# Patient Record
Sex: FEMALE | HISPANIC OR LATINO | Employment: FULL TIME | URBAN - METROPOLITAN AREA
[De-identification: names, ages, dates, MRNs, and addresses within clinical notes are randomized per-mention and may not be internally consistent; named-entity substitution may affect disease eponyms.]

---

## 2017-04-25 ENCOUNTER — OFFICE VISIT (OUTPATIENT)
Dept: MEDICAL GROUP | Facility: CLINIC | Age: 47
End: 2017-04-25
Payer: COMMERCIAL

## 2017-04-25 VITALS
WEIGHT: 145 LBS | BODY MASS INDEX: 24.75 KG/M2 | HEIGHT: 64 IN | SYSTOLIC BLOOD PRESSURE: 112 MMHG | HEART RATE: 60 BPM | OXYGEN SATURATION: 99 % | TEMPERATURE: 98.1 F | DIASTOLIC BLOOD PRESSURE: 70 MMHG | RESPIRATION RATE: 14 BRPM

## 2017-04-25 DIAGNOSIS — R68.89 LIP SYMPTOM: ICD-10-CM

## 2017-04-25 DIAGNOSIS — J30.2 OTHER SEASONAL ALLERGIC RHINITIS: ICD-10-CM

## 2017-04-25 DIAGNOSIS — J34.9 NOSE SYMPTOM: ICD-10-CM

## 2017-04-25 PROCEDURE — 99214 OFFICE O/P EST MOD 30 MIN: CPT | Performed by: NURSE PRACTITIONER

## 2017-04-25 RX ORDER — LORATADINE 10 MG/1
CAPSULE, LIQUID FILLED ORAL
COMMUNITY
End: 2018-11-12

## 2017-04-25 RX ORDER — FLUTICASONE PROPIONATE 50 MCG
1 SPRAY, SUSPENSION (ML) NASAL DAILY
Qty: 16 G | Refills: 11 | Status: SHIPPED | OUTPATIENT
Start: 2017-04-25 | End: 2018-04-24 | Stop reason: SDUPTHER

## 2017-04-25 NOTE — Clinical Note
April 25, 2017         Patient: Jamilah Chadwick   YOB: 1970   Date of Visit: 4/25/2017           To Whom it May Concern:    Jamilah Chadwick was seen in my clinic on 4/25/2017.    If you have any questions or concerns, please don't hesitate to call.        Sincerely,           DAYANA Leach  Electronically Signed

## 2017-04-25 NOTE — MR AVS SNAPSHOT
"        Jamilah Chadwick   2017 4:00 PM   Office Visit   MRN: [id number]    Department:  [facility]   Dept Phone:  [phone]    Description:  Female : 1970   Provider:  DAYANA Leach           Reason for Visit     Follow-Up Sore bottom lip x 2 months; also seasonal allergies       Allergies as of 2017     No Known Allergies      You were diagnosed with     Other seasonal allergic rhinitis   [9590361]       Nose symptom   [872139]         Vital Signs     Blood Pressure Pulse Temperature Respirations Height Weight    112/70 mmHg 60 36.7 °C (98.1 °F) 14 1.626 m (5' 4.02\") 65.772 kg (145 lb)    Body Mass Index Oxygen Saturation Last Menstrual Period Breastfeeding? Smoking Status       24.88 kg/m2 99% 2014 No Never Smoker        Basic Information     Date Of Birth Sex Race Ethnicity Preferred Language    1970 Female  or   Origin (Italian,Serbian,Luxembourger,Anuj, etc) English      Problem List              ICD-10-CM Priority Class Noted - Resolved    Carpal tunnel syndrome G56.00   Unknown - Present    Pure hypercholesterolemia E78.00   2016 - Present    Allergic rhinitis J30.9   2016 - Present      Health Maintenance        Date Due Completion Dates    MAMMOGRAM 2016 (Done), 2014 (Done), 10/6/2005    Override on 2015: Done (NL)    Override on 2014: Done    IMM DTaP/Tdap/Td Vaccine (2 - Td) 2025            Current Immunizations     Tdap Vaccine 2015      Below and/or attached are the medications your provider expects you to take. Review all of your home medications and newly ordered medications with your provider and/or pharmacist. Follow medication instructions as directed by your provider and/or pharmacist. Please keep your medication list with you and share with your provider. Update the information when medications are discontinued, doses are changed, or new medications (including " over-the-counter products) are added; and carry medication information at all times in the event of emergency situations     Allergies:  No Known Allergies          Medications  Valid as of: April 25, 2017 -  4:20 PM    Generic Name Brand Name Tablet Size Instructions for use    Estradiol (Cream) ESTRACE VAGINAL 0.1 MG/GM         Fluticasone Propionate (Suspension) FLONASE 50 MCG/ACT Spray 1 Spray in nose every day.        Loratadine (Cap) Loratadine 10 MG Take  by mouth.        Mupirocin (Ointment) BACTROBAN 2 % Apply 1 Application to affected area(s) every day.        .                 Medicines prescribed today were sent to:     [id number] - [place], [place] - [address]    [address] [place] [address]    Phone: [phone] Fax: [phone]    Open 24 Hours?: No      Medication refill instructions:       If your prescription bottle indicates you have medication refills left, it is not necessary to call your provider’s office. Please contact your pharmacy and they will refill your medication.    If your prescription bottle indicates you do not have any refills left, you may request refills at any time through one of the following ways: The online [employer] system (except Urgent Care), by calling your provider’s office, or by asking your pharmacy to contact your provider’s office with a refill request. Medication refills are processed only during regular business hours and may not be available until the next business day. Your provider may request additional information or to have a follow-up visit with you prior to refilling your medication.   *Please Note: Medication refills are assigned a new Rx number when refilled electronically. Your pharmacy may indicate that no refills were authorized even though a new prescription for the same medication is available at the pharmacy. Please request the medicine by name with the pharmacy before contacting your provider for a refill.           [employer] Access Code:  Activation code not generated  Current MyChart Status: Active

## 2017-04-25 NOTE — PROGRESS NOTES
"CC: Follow-Up        HPI:     Jamilah presents today for the followin. Other seasonal allergic rhinitis  Cc been using Claritin however due to insurance change she needs a new prescription for her Flonase. This lasted a better control for seasonal allergies. She's had nasal congestion postnasal drip and drainage. She states when she wakes up after being in a supine position this can lead to some \"chest congestion \" and she felt well this makes it more difficult for her to breathe      2. Nose symptom  Patient states over the last couple months she's been having a sore on the inside of her right nostril towards the end. She states sometimes it makes her nose appears swollen. Painful to the touch.    3. Lip symptom  Complains of 2 months with a burning sensation just under her bottom lip. States it's most every day but not all data be intermittent. No association of injury or trauma. No associated rash or changes in skin. She states it feels better she applies pressure when the symptom is present.    Current Outpatient Prescriptions   Medication Sig Dispense Refill   • Loratadine (CLARITIN) 10 MG Cap Take  by mouth.     • fluticasone (FLONASE) 50 MCG/ACT nasal spray Spray 1 Spray in nose every day. 16 g 11   • mupirocin (BACTROBAN) 2 % Ointment Apply 1 Application to affected area(s) every day. 1 Tube 0   • ESTRACE VAGINAL 0.1 MG/GM vaginal cream        No current facility-administered medications for this visit.     Social History   Substance Use Topics   • Smoking status: Never Smoker    • Smokeless tobacco: Never Used   • Alcohol Use: No     I reviewed patients allergies, problem list and medications today in [facility].    ROS: Any/all pertinent positives listed in the HPI, otherwise all others reviewed are negative today.      /70 mmHg  Pulse 60  Temp(Src) 36.7 °C (98.1 °F)  Resp 14  Ht 1.626 m (5' 4.02\")  Wt 65.772 kg (145 lb)  BMI 24.88 kg/m2  SpO2 99%  LMP 2014  Breastfeeding? No    Exam:  "   Gen: Alert and oriented, No apparent distress. WDWN  Psych: A+Ox3, normal affect and mood  Skin: Warm, dry and intact. Good turgor   No rashes in visible areas.  Eye: Conjunctiva clear, lids normal  ENMT: Lips without lesions-no swelling, rash or skin disruption noted internally or externally, good dentition  Oropharynx clear, mild erythema bilaterally disturbance. Right near at the most proximal portion shows very mild irritation in her crease. There is no abscess palpable pustule vesicle noted. This is in the area that she points out where there is irritation and sometimes swelling  Neck: No Lymphadenopathy, Thyromegaly, Bruits.   Trachea midline, no masses  Lungs: Clear to auscultation bilaterally, no rales or rhonchi   Unlabored respiratory effort.   CV: Regular rate and rhythm, S1, S2. No murmurs.   No Edema      Assessment and Plan.   47 y.o. female with the following issues.    1. Other seasonal allergic rhinitis  Stable.  Continue claritin.  Restart flonase.  Discussed nettipot  - fluticasone (FLONASE) 50 MCG/ACT nasal spray; Spray 1 Spray in nose every day.  Dispense: 16 g; Refill: 11    2. Nose symptom  Stable. Reassurance bactroban to nose.  - mupirocin (BACTROBAN) 2 % Ointment; Apply 1 Application to affected area(s) every day.  Dispense: 1 Tube; Refill: 0    3. Lip symptom  Unknown etiology. Normal exam. Discussed not manipulate the tissue in the way that she was showing in the office.

## 2017-04-26 ENCOUNTER — TELEPHONE (OUTPATIENT)
Dept: MEDICAL GROUP | Facility: CLINIC | Age: 47
End: 2017-04-26

## 2017-04-26 DIAGNOSIS — E78.00 PURE HYPERCHOLESTEROLEMIA: ICD-10-CM

## 2017-04-26 DIAGNOSIS — Z13.29 SCREENING FOR THYROID DISORDER: ICD-10-CM

## 2017-04-26 DIAGNOSIS — Z13.21 ENCOUNTER FOR VITAMIN DEFICIENCY SCREENING: ICD-10-CM

## 2017-04-26 DIAGNOSIS — J30.2 SEASONAL ALLERGIC RHINITIS, UNSPECIFIED ALLERGIC RHINITIS TRIGGER: ICD-10-CM

## 2017-04-26 NOTE — TELEPHONE ENCOUNTER
1. Caller Name: Brandon                                         Call Back Number: [phone] (home)         Patient approves a detailed voicemail message: yes    2. SPECIFIC Action To Be Taken: routine blood orders needed (CBC, CMP, TSH, LIPIL, A1C, VITAMIN D) anything else she needs    3. Diagnosis/Clinical Reason for Request: routine     4. Specialty & Provider Name/Lab/Imaging Location: lab    5. Is appointment scheduled for requested order/referral: no    Patient informed they will receive a return phone call from the office ONLY if there are any questions before processing their request. Advised to call back if they haven't received a call from the referral department in 5 days.

## 2017-04-26 NOTE — TELEPHONE ENCOUNTER
She isnt due for routine labs until 9/2017.  Labs were done last 9/2016-normal.    I can pre-order and she can complete in sept if she wishes.  Sugar has been normal-no reason to order A1c.  Insurance will not cover it.    They are ordered.  Will need to send to patient as she uses [facility]

## 2017-10-03 ENCOUNTER — OFFICE VISIT (OUTPATIENT)
Dept: MEDICAL GROUP | Facility: CLINIC | Age: 47
End: 2017-10-03
Payer: COMMERCIAL

## 2017-10-03 VITALS
OXYGEN SATURATION: 99 % | SYSTOLIC BLOOD PRESSURE: 112 MMHG | BODY MASS INDEX: 24.59 KG/M2 | HEIGHT: 64 IN | DIASTOLIC BLOOD PRESSURE: 70 MMHG | RESPIRATION RATE: 14 BRPM | TEMPERATURE: 97.9 F | WEIGHT: 144 LBS | HEART RATE: 62 BPM

## 2017-10-03 DIAGNOSIS — Z28.21 INFLUENZA VACCINATION DECLINED: ICD-10-CM

## 2017-10-03 DIAGNOSIS — M79.672 LEFT FOOT PAIN: ICD-10-CM

## 2017-10-03 PROCEDURE — 99214 OFFICE O/P EST MOD 30 MIN: CPT | Performed by: NURSE PRACTITIONER

## 2017-10-03 RX ORDER — GABAPENTIN 100 MG/1
100 CAPSULE ORAL 3 TIMES DAILY
Qty: 90 CAP | Refills: 5 | Status: SHIPPED | OUTPATIENT
Start: 2017-10-03 | End: 2018-11-12

## 2017-10-03 RX ORDER — METHYLPREDNISOLONE 4 MG/1
TABLET ORAL
Qty: 21 TAB | Refills: 0 | Status: SHIPPED | OUTPATIENT
Start: 2017-10-03 | End: 2018-04-24

## 2017-10-03 ASSESSMENT — PATIENT HEALTH QUESTIONNAIRE - PHQ9: CLINICAL INTERPRETATION OF PHQ2 SCORE: 0

## 2017-10-03 NOTE — LETTER
[employer] [facility]  AGUSTÍN Leach.  [address]  [place] [address]  Fax: [phone]   Authorization for Release/Disclosure of   Protected Health Information   Name: JAMILAH DELGADO : 1970 SSN: [id number]   Address: [address] [place], [place] [address] [place] [address] Phone:    [phone] (home)    I authorize the entity listed below to release/disclose the PHI below to:   [facility]/DAYANA Leach and DAYANA Leach   Provider or Entity Name:  Dr Jimenez   [facility], [facility]  [address], [address]  [place] [address]  Phone:  [phone]    Fax:  [phone]   Reason for request: continuity of care   Information to be released:    [  ] LAST COLONOSCOPY,  including any PATH REPORT and follow-up  [  ] LAST FIT/COLOGUARD RESULT [  ] LAST DEXA  [  ] LAST MAMMOGRAM  [  ] LAST PAP  [  ] LAST LABS [  ] RETINA EXAM REPORT  [  ] IMMUNIZATION RECORDS  [X] Release all info      [  ] Check here and initial the line next to each item to release ALL health information INCLUDING  _____ Care and treatment for drug and / or alcohol abuse  _____ HIV testing, infection status, or AIDS  _____ Genetic Testing    DATES OF SERVICE OR TIME PERIOD TO BE DISCLOSED: _____________  I understand and acknowledge that:  * This Authorization may be revoked at any time by you in writing, except if your health information has already been used or disclosed.  * Your health information that will be used or disclosed as a result of you signing this authorization could be re-disclosed by the recipient. If this occurs, your re-disclosed health information may no longer be protected by State or Federal laws.  * You may refuse to sign this Authorization. Your refusal will not affect your ability to obtain treatment.  * This Authorization becomes effective upon signing and will  on (date) __________.      If no date is indicated, this Authorization will  one (1) year from the signature date.    Name: Jamilah  Netta    Signature:   Date:     10/3/2017       PLEASE FAX REQUESTED RECORDS BACK TO: [phone]

## 2017-10-03 NOTE — LETTER
[facility]  AGUSTÍN Leach.  [address]  [place] [address]  Fax: [phone]   Authorization for Release/Disclosure of   Protected Health Information   Name: JAMILAH DELGADO : 1970 SSN: [id number]   Address: [address] [place] [address]  [place] [address] Phone:    [phone] (home)    I authorize the entity listed below to release/disclose the PHI below to:   [facility]/DAYANA Leach and DAYANA Leach   Provider or Entity Name:  [facility]   Address   City, [facility]  [place] Phone:      Fax:  [phone]   Reason for request: continuity of care   Information to be released:    [  ] LAST COLONOSCOPY,  including any PATH REPORT and follow-up  [  ] LAST FIT/COLOGUARD RESULT [  ] LAST DEXA  [X] LAST MAMMOGRAM  [  ] LAST PAP  [  ] LAST LABS [  ] RETINA EXAM REPORT  [  ] IMMUNIZATION RECORDS  [  ] Release all info      [  ] Check here and initial the line next to each item to release ALL health information INCLUDING  _____ Care and treatment for drug and / or alcohol abuse  _____ HIV testing, infection status, or AIDS  _____ Genetic Testing    DATES OF SERVICE OR TIME PERIOD TO BE DISCLOSED: LAST MAMMOGRAM APPROX  understand and acknowledge that:  * This Authorization may be revoked at any time by you in writing, except if your health information has already been used or disclosed.  * Your health information that will be used or disclosed as a result of you signing this authorization could be re-disclosed by the recipient. If this occurs, your re-disclosed health information may no longer be protected by State or Federal laws.  * You may refuse to sign this Authorization. Your refusal will not affect your ability to obtain treatment.  * This Authorization becomes effective upon signing and will  on (date) __________.      If no date is indicated, this Authorization will  one (1) year from the signature date.    Name: Jamilah  Netta    Signature:   Date:     10/3/2017       PLEASE FAX REQUESTED RECORDS BACK TO: [phone]

## 2017-10-03 NOTE — PROGRESS NOTES
"CC: Knee Pain (Left knee pain that spreads down shin and has a firm, small bump x 6 months)        HPI:     Jamilah presents today for the followin. Left foot pain  Here today with a family member stating she did see the podiatrist regarding the pain was on the ball of her left foot. She did get an injection and had an x-ray there that was normal. The injection helped somewhat however she feels the pain now is radiating up the lateral side of her left leg to the knee. No new injury or trauma. The pain is worse after a days work. She currently is not in pain today however she does feel most days.  She is still small bump on her shin on the left side and she was unsure if this is related    Overall pain is not present for 6 months. They did talk about orthotics at the podiatrist but they weren't sure if her insurance cover it so she has not pursued this at this time    Not better or worse with any specific shoe    Current Outpatient Prescriptions   Medication Sig Dispense Refill   • MethylPREDNISolone (MEDROL DOSEPAK) 4 MG Tablet Therapy Pack As directed on the packaging label. 21 Tab 0   • gabapentin (NEURONTIN) 100 MG Cap Take 1 Cap by mouth 3 times a day. 90 Cap 5   • Loratadine (CLARITIN) 10 MG Cap Take  by mouth.     • fluticasone (FLONASE) 50 MCG/ACT nasal spray Spray 1 Spray in nose every day. 16 g 11   • mupirocin (BACTROBAN) 2 % Ointment Apply 1 Application to affected area(s) every day. 1 Tube 0   • ESTRACE VAGINAL 0.1 MG/GM vaginal cream        No current facility-administered medications for this visit.      Social History   Substance Use Topics   • Smoking status: Never Smoker   • Smokeless tobacco: Never Used   • Alcohol use No     I reviewed patients allergies, problem list and medications today in [facility].    ROS: Any/all pertinent positives listed in the HPI, otherwise all others reviewed are negative today.      /70   Pulse 62   Temp 36.6 °C (97.9 °F)   Resp 14   Ht 1.626 m (5' 4\")   Wt " 65.3 kg (144 lb)   SpO2 99%   Breastfeeding? No   BMI 24.72 kg/m²     Exam:    Gen: Alert and oriented, No apparent distress. WDWN  Psych: A+Ox3, normal affect and mood  Skin: Warm, dry and intact. Good turgor   No rashes in visible areas.  Eye: Conjunctiva clear, lids normal  ENMT: Lips without lesions, good dentition  Lungs:Unlabored respiratory effort.   Ext: No clubbing, cyanosis, edema.   No gross deformity or edema edema or appreciable warmth noted on the left foot ankle or leg. I am unable to reproduce any pain or discomfort on exam especially with the foot today. There is a 1-2 mm firm mobile subcutaneous likely nodule over the left shin. Believe this to be noncontributory    Assessment and Plan.   47 y.o. female with the following issues.    1. Left foot pain  Stable. Normal exam. We will request the records from her podiatrist have a better idea. Will try Medrol Dosepak to see if this fixes any of her pain and inflammation. If not I'll have her start gabapentin once at night for the first week, 2 times a day secondly, increase to 3 times a day and third week continue at that dose.  Reassurance given regarding the tiny palpable bump over her left shin  - MethylPREDNISolone (MEDROL DOSEPAK) 4 MG Tablet Therapy Pack; As directed on the packaging label.  Dispense: 21 Tab; Refill: 0  - gabapentin (NEURONTIN) 100 MG Cap; Take 1 Cap by mouth 3 times a day.  Dispense: 90 Cap; Refill: 5    2. Influenza vaccination declined

## 2017-11-21 ENCOUNTER — TELEPHONE (OUTPATIENT)
Dept: MEDICAL GROUP | Facility: CLINIC | Age: 47
End: 2017-11-21

## 2017-11-21 NOTE — TELEPHONE ENCOUNTER
Please call Anne. She is a Beninese speaker. Let her know her blood sugar, liver, kidneys are normal. Cholesterol is borderline and I recommend she watch her carbohydrate, sugar and fat intake. Regular routine exercise will help this as well. Thyroid is normal. Vitamin D slightly low and recommende she buy an over-the-counter vitamin D3 supplement of 5000 units to take daily

## 2018-04-24 ENCOUNTER — OFFICE VISIT (OUTPATIENT)
Dept: MEDICAL GROUP | Facility: CLINIC | Age: 48
End: 2018-04-24
Payer: COMMERCIAL

## 2018-04-24 VITALS
HEIGHT: 64 IN | DIASTOLIC BLOOD PRESSURE: 70 MMHG | BODY MASS INDEX: 24.24 KG/M2 | OXYGEN SATURATION: 99 % | TEMPERATURE: 98.2 F | WEIGHT: 142 LBS | HEART RATE: 65 BPM | RESPIRATION RATE: 14 BRPM | SYSTOLIC BLOOD PRESSURE: 110 MMHG

## 2018-04-24 DIAGNOSIS — G56.01 CARPAL TUNNEL SYNDROME OF RIGHT WRIST: ICD-10-CM

## 2018-04-24 DIAGNOSIS — R20.0 NUMBNESS OF FINGERS: ICD-10-CM

## 2018-04-24 DIAGNOSIS — H92.03 EARACHE SYMPTOMS IN BOTH EARS: ICD-10-CM

## 2018-04-24 DIAGNOSIS — J30.2 OTHER SEASONAL ALLERGIC RHINITIS: ICD-10-CM

## 2018-04-24 PROCEDURE — 99214 OFFICE O/P EST MOD 30 MIN: CPT | Performed by: NURSE PRACTITIONER

## 2018-04-24 RX ORDER — FLUTICASONE PROPIONATE 50 MCG
2 SPRAY, SUSPENSION (ML) NASAL DAILY
Qty: 16 G | Refills: 11 | Status: SHIPPED | OUTPATIENT
Start: 2018-04-24 | End: 2018-11-12

## 2018-04-24 RX ORDER — NAPROXEN 500 MG/1
500 TABLET ORAL 2 TIMES DAILY WITH MEALS
Qty: 14 TAB | Refills: 0 | Status: SHIPPED | OUTPATIENT
Start: 2018-04-24 | End: 2019-02-14 | Stop reason: SDUPTHER

## 2018-04-24 NOTE — PROGRESS NOTES
CC: Otalgia (that spreads to head and around the ear x 1 week;) and Numbness (in left hand x 2-3 weeks)        HPI:     Jamilah presents today for the followin. Numbness of fingers/Carpal tunnel syndrome of right wrist  Today complaining of no pain however little bit of a tingling sensation on the distal ends of her second third and fourth digits on the left hand for 2-3 weeks. Historically about 2 or 3 years ago she had carpal tunnel release done by Dr. Holley. She states since then she's been using her support rest every night. This symptom tends to be more present if she is doing repetitive motions at work or if she is driving. Does not wake her up at night    3. Other seasonal allergic rhiniti/Earache symptoms in both ears  States for the last week she's been having an itching sensation in her left inner ear and a pressure and sometimes painful sensation on the right inner ear. She states she is traveling to [place] soon and would like to make sure she doesn't have an ear infection as this can bother her on the flight    Current Outpatient Prescriptions   Medication Sig Dispense Refill   • neomycin sulf/polymyx B sulf/HC soln (CORTISPORIN HC SOL) 3.5-48704-6 Solution Place 3 Drops in ear 4 times a day. Administer drops to both ears. 1 Bottle 0   • fluticasone (FLONASE) 50 MCG/ACT nasal spray Spray 2 Sprays in nose every day. 16 g 11   • naproxen (NAPROSYN) 500 MG Tab Take 1 Tab by mouth 2 times a day, with meals for 7 days. 14 Tab 0   • gabapentin (NEURONTIN) 100 MG Cap Take 1 Cap by mouth 3 times a day. 90 Cap 5   • Loratadine (CLARITIN) 10 MG Cap Take  by mouth.     • ESTRACE VAGINAL 0.1 MG/GM vaginal cream        No current facility-administered medications for this visit.      Social History   Substance Use Topics   • Smoking status: Never Smoker   • Smokeless tobacco: Never Used   • Alcohol use No     I reviewed patients allergies, problem list and medications today in [facility].    ROS: Any/all  "pertinent positives listed in the HPI, otherwise all others reviewed are negative today.      /70   Pulse 65   Temp 36.8 °C (98.2 °F)   Resp 14   Ht 1.626 m (5' 4\")   Wt 64.4 kg (142 lb)   SpO2 99%   Breastfeeding? No   BMI 24.37 kg/m²     Exam:   Gen: Alert and oriented, No apparent distress. WDWN  Psych: A+Ox3, normal affect and mood  Skin: Warm, dry and intact. Good turgor   No rashes in visible areas.  Eye: Conjunctiva clear, lids normal  ENMT: Lips without lesions, good dentition   Oropharynx clear.  TM fairly unremarkable with some mild erythema over normal-looking TM on the left ear.  Neck: No Lymphadenopathy, Thyromegaly, Bruits.   Trachea midline, no masses  Lungs: Clear to auscultation bilaterally, no rales or rhonchi   Unlabored respiratory effort.   CV: Regular rate and rhythm, S1, S2. No murmurs.   No Edema  Positive Phalen's      Assessment and Plan.   48 y.o. female with the following issues.    1. Numbness of fingers/Carpal tunnel syndrome of right wrist  Suspect carpal tunnel. We'll have her follow-up could be reevaluated with orthopedics. For a week will do a trial of naproxen which she tolerated well the past. Shetake it with food. Continue her wrist splint  - REFERRAL TO ORTHOPEDICS  - naproxen (NAPROSYN) 500 MG Tab; Take 1 Tab by mouth 2 times a day, with meals for 7 days.  Dispense: 14 Tab; Refill: 0    3. Other seasonal allergic rhinitis /. Earache symptoms in both ears  Stable. Suspect some of this is allergy related. We'll have her do the Cortisporin drops bilaterally as discussed the office and then restart her Flonase  - neomycin sulf/polymyx B sulf/HC soln (CORTISPORIN HC SOL) 3.5-04965-1 Solution; Place 3 Drops in ear 4 times a day. Administer drops to both ears.  Dispense: 1 Bottle; Refill: 0  - fluticasone (FLONASE) 50 MCG/ACT nasal spray; Spray 2 Sprays in nose every day.  Dispense: 16 g; Refill: 11        "

## 2018-05-01 ENCOUNTER — TELEPHONE (OUTPATIENT)
Dept: MEDICAL GROUP | Facility: CLINIC | Age: 48
End: 2018-05-01

## 2018-05-01 NOTE — TELEPHONE ENCOUNTER
1. Caller Name: Brandon                                         Call Back Number: [phone] (home)         Patient approves a detailed voicemail message: yes    Pt's son, Brandon, called to ask that dx numbness of both feet be added to ref for ZINA so that all of her issues can be addressed.    Angeles, if you're ok with me adding the dx to ref, I will let ref dept know to update it. I just wanted to double check before adding...

## 2018-11-12 ENCOUNTER — APPOINTMENT (OUTPATIENT)
Dept: RADIOLOGY | Facility: MEDICAL CENTER | Age: 48
End: 2018-11-12
Attending: EMERGENCY MEDICINE
Payer: COMMERCIAL

## 2018-11-12 ENCOUNTER — HOSPITAL ENCOUNTER (EMERGENCY)
Facility: MEDICAL CENTER | Age: 48
End: 2018-11-12
Attending: EMERGENCY MEDICINE
Payer: COMMERCIAL

## 2018-11-12 ENCOUNTER — TELEPHONE (OUTPATIENT)
Dept: HEALTH INFORMATION MANAGEMENT | Facility: OTHER | Age: 48
End: 2018-11-12

## 2018-11-12 ENCOUNTER — TELEPHONE (OUTPATIENT)
Dept: SCHEDULING | Facility: IMAGING CENTER | Age: 48
End: 2018-11-12

## 2018-11-12 VITALS
DIASTOLIC BLOOD PRESSURE: 65 MMHG | BODY MASS INDEX: 24.98 KG/M2 | HEART RATE: 71 BPM | HEIGHT: 65 IN | TEMPERATURE: 97.8 F | OXYGEN SATURATION: 98 % | WEIGHT: 149.91 LBS | SYSTOLIC BLOOD PRESSURE: 114 MMHG | RESPIRATION RATE: 16 BRPM

## 2018-11-12 DIAGNOSIS — R07.89 ATYPICAL CHEST PAIN: ICD-10-CM

## 2018-11-12 LAB
ALBUMIN SERPL BCP-MCNC: 4.2 G/DL (ref 3.2–4.9)
ALBUMIN/GLOB SERPL: 1.8 G/DL
ALP SERPL-CCNC: 51 U/L (ref 30–99)
ALT SERPL-CCNC: 27 U/L (ref 2–50)
ANION GAP SERPL CALC-SCNC: 7 MMOL/L (ref 0–11.9)
AST SERPL-CCNC: 21 U/L (ref 12–45)
BASOPHILS # BLD AUTO: 0.9 % (ref 0–1.8)
BASOPHILS # BLD: 0.04 K/UL (ref 0–0.12)
BILIRUB SERPL-MCNC: 0.5 MG/DL (ref 0.1–1.5)
BNP SERPL-MCNC: 12 PG/ML (ref 0–100)
BUN SERPL-MCNC: 15 MG/DL (ref 8–22)
CALCIUM SERPL-MCNC: 9 MG/DL (ref 8.4–10.2)
CHLORIDE SERPL-SCNC: 102 MMOL/L (ref 96–112)
CO2 SERPL-SCNC: 24 MMOL/L (ref 20–33)
CREAT SERPL-MCNC: 0.65 MG/DL (ref 0.5–1.4)
EKG IMPRESSION: NORMAL
EOSINOPHIL # BLD AUTO: 0.1 K/UL (ref 0–0.51)
EOSINOPHIL NFR BLD: 2.2 % (ref 0–6.9)
ERYTHROCYTE [DISTWIDTH] IN BLOOD BY AUTOMATED COUNT: 38.4 FL (ref 35.9–50)
GLOBULIN SER CALC-MCNC: 2.3 G/DL (ref 1.9–3.5)
GLUCOSE SERPL-MCNC: 92 MG/DL (ref 65–99)
HCT VFR BLD AUTO: 39.1 % (ref 37–47)
HGB BLD-MCNC: 13.3 G/DL (ref 12–16)
IMM GRANULOCYTES # BLD AUTO: 0.02 K/UL (ref 0–0.11)
IMM GRANULOCYTES NFR BLD AUTO: 0.4 % (ref 0–0.9)
LYMPHOCYTES # BLD AUTO: 1.72 K/UL (ref 1–4.8)
LYMPHOCYTES NFR BLD: 37.2 % (ref 22–41)
MCH RBC QN AUTO: 29.4 PG (ref 27–33)
MCHC RBC AUTO-ENTMCNC: 34 G/DL (ref 33.6–35)
MCV RBC AUTO: 86.5 FL (ref 81.4–97.8)
MONOCYTES # BLD AUTO: 0.28 K/UL (ref 0–0.85)
MONOCYTES NFR BLD AUTO: 6.1 % (ref 0–13.4)
NEUTROPHILS # BLD AUTO: 2.46 K/UL (ref 2–7.15)
NEUTROPHILS NFR BLD: 53.2 % (ref 44–72)
NRBC # BLD AUTO: 0 K/UL
NRBC BLD-RTO: 0 /100 WBC
PLATELET # BLD AUTO: 195 K/UL (ref 164–446)
PMV BLD AUTO: 10 FL (ref 9–12.9)
POTASSIUM SERPL-SCNC: 3.8 MMOL/L (ref 3.6–5.5)
PROT SERPL-MCNC: 6.5 G/DL (ref 6–8.2)
RBC # BLD AUTO: 4.52 M/UL (ref 4.2–5.4)
SODIUM SERPL-SCNC: 133 MMOL/L (ref 135–145)
TROPONIN I SERPL-MCNC: <0.02 NG/ML (ref 0–0.04)
WBC # BLD AUTO: 4.6 K/UL (ref 4.8–10.8)

## 2018-11-12 PROCEDURE — 83880 ASSAY OF NATRIURETIC PEPTIDE: CPT

## 2018-11-12 PROCEDURE — 80053 COMPREHEN METABOLIC PANEL: CPT

## 2018-11-12 PROCEDURE — 36415 COLL VENOUS BLD VENIPUNCTURE: CPT

## 2018-11-12 PROCEDURE — A9270 NON-COVERED ITEM OR SERVICE: HCPCS | Performed by: EMERGENCY MEDICINE

## 2018-11-12 PROCEDURE — 99284 EMERGENCY DEPT VISIT MOD MDM: CPT

## 2018-11-12 PROCEDURE — 700102 HCHG RX REV CODE 250 W/ 637 OVERRIDE(OP): Performed by: EMERGENCY MEDICINE

## 2018-11-12 PROCEDURE — 84484 ASSAY OF TROPONIN QUANT: CPT

## 2018-11-12 PROCEDURE — 93005 ELECTROCARDIOGRAM TRACING: CPT | Performed by: EMERGENCY MEDICINE

## 2018-11-12 PROCEDURE — 85025 COMPLETE CBC W/AUTO DIFF WBC: CPT

## 2018-11-12 PROCEDURE — 71045 X-RAY EXAM CHEST 1 VIEW: CPT

## 2018-11-12 RX ORDER — AMOXICILLIN 875 MG/1
875 TABLET, COATED ORAL 2 TIMES DAILY
COMMUNITY
Start: 2018-11-07 | End: 2018-11-20

## 2018-11-12 RX ORDER — FLUTICASONE PROPIONATE 50 MCG
1 SPRAY, SUSPENSION (ML) NASAL PRN
Status: SHIPPED | COMMUNITY
End: 2021-05-25

## 2018-11-12 RX ORDER — NAPROXEN SODIUM 220 MG
440 TABLET ORAL PRN
Status: SHIPPED | COMMUNITY
End: 2019-02-15

## 2018-11-12 RX ORDER — ASPIRIN 81 MG/1
324 TABLET, CHEWABLE ORAL ONCE
Status: COMPLETED | OUTPATIENT
Start: 2018-11-12 | End: 2018-11-12

## 2018-11-12 RX ORDER — NAPROXEN 500 MG/1
500 TABLET ORAL 2 TIMES DAILY WITH MEALS
Status: SHIPPED | COMMUNITY
End: 2019-02-15

## 2018-11-12 RX ADMIN — LIDOCAINE HYDROCHLORIDE 30 ML: 20 SOLUTION OROPHARYNGEAL at 14:54

## 2018-11-12 RX ADMIN — ASPIRIN 81 MG 324 MG: 81 TABLET ORAL at 14:54

## 2018-11-12 ASSESSMENT — PAIN SCALES - GENERAL: PAINLEVEL_OUTOF10: 9

## 2018-11-12 NOTE — TELEPHONE ENCOUNTER
CM outreach call to patient after receiving message from MA that patient called call center earlier with complaints of chest pain. Per notes patient had declined ER evaluation. CM called patient and spoke to patient.  Patient requesting CM talk to her son. CM spoke to son who reports he is currently with patient and is going to take her to the ER for evaluation. Son reports his mother has been having pain in her chest at times. States it is worse when she is lifting her arms above head.  Reports patient also feels like she has a lump in her throat at times with swallowing. Denies any shortness of breath.  CM recommend patient get evaluated in ER. Son verbalized understanding stating he would take his Mother to ER. Message routed to PCP.

## 2018-11-12 NOTE — ED NOTES
ERP at bedside. Pt agrees with plan of care discussed by ERP. AIDET acknowledged with patient. Klaus in low position, side rail up for pt safety. Call light within reach. Blood to lab. Will continue to monitor.

## 2018-11-12 NOTE — ED NOTES
Med rec updated and complete  Allergies reviewed  Interviewed pt with family at bedside with permission from pt.  Pt reports no vitamins.  Pt was not sure what antibiotic she is taking.  Called Ferdinand @ 013-2015 to verify pts antibiotic.

## 2018-11-12 NOTE — ED PROVIDER NOTES
ED Provider Note    Scribed for Mandeep Dickinson M.D. by Mandeep Dickinson. 11/12/2018,  2:31 PM.    CHIEF COMPLAINT  Chief Complaint   Patient presents with   • Chest Pressure     Started this morning while working. Mid chest, radiates to mid back. Pt reports it is intermittent. Associated SOB. Denies nausea/diaphoresis.        HPI  Jamilah Bearden is a 48 y.o. female who presents to the Emergency Department for chest pressure/tightness, with some radiation to the right side of her back.  She reports that it is intermittent, worse when she eats, no other associated exacerbating or relieving factors.  She denies nausea or vomiting or diaphoresis.  She reports that she starts to feel some shortness of breath and tightness around her throat when the symptoms come on.  She has not had any vomiting or decreased appetite, constipation or diarrhea, fevers or chills.  She has no history of cardiovascular disease, hypertension, denies any daily medications.  She does have a history of GERD and high cholesterol.    REVIEW OF SYSTEMS  See HPI for further details. All other systems are negative.     PAST MEDICAL HISTORY   has a past medical history of Carpal tunnel syndrome; GERD (gastroesophageal reflux disease); and Pure hypercholesterolemia (8/23/2016).    SOCIAL HISTORY  Social History     Social History Main Topics   • Smoking status: Never Smoker   • Smokeless tobacco: Never Used   • Alcohol use No   • Drug use: No   • Sexual activity: No      Comment:  x 28 years     History   Drug Use No       SURGICAL HISTORY   has a past surgical history that includes hysterectomy, vaginal (3/2015); cholecystectomy; and pelviscopy.    CURRENT MEDICATIONS  Home Medications     Reviewed by Corey Lott (Pharmacy Tech) on 11/12/18 at 1529  Med List Status: Complete   Medication Last Dose Status   amoxicillin (AMOXIL) 875 MG tablet 11/12/2018 Active   ESTRACE VAGINAL 0.1 MG/GM vaginal cream 11/9/2018 Active  "  fluticasone (FLONASE) 50 MCG/ACT nasal spray > 1 week Active   naproxen (ALEVE) 220 MG tablet > 5 days Active   naproxen (NAPROSYN) 500 MG Tab 11/11/2018 Active                ALLERGIES  No Known Allergies    PHYSICAL EXAM  VITAL SIGNS: /65   Pulse 71   Temp 36.6 °C (97.8 °F)   Resp 16   Ht 1.651 m (5' 5\")   Wt 68 kg (149 lb 14.6 oz)   LMP 03/01/2015   SpO2 98%   BMI 24.95 kg/m²   Pulse ox interpretation: I interpret this pulse ox as normal.  Constitutional: Alert in no apparent distress.  Anxious.  HENT: No signs of trauma, Bilateral external ears normal, Nose normal.   Eyes: Conjunctiva normal, Non-icteric.   Neck: Normal range of motion, Supple, No stridor.   Lymphatic: No lymphadenopathy noted.   Cardiovascular: Regular rate and rhythm, no murmurs.   Thorax & Lungs: Normal breath sounds, No respiratory distress, No wheezing, Ariel producible diffuse upper anterior chest wall tenderness.  No visible or palpable trauma.    Abdomen: Bowel sounds normal, Soft, No tenderness, No masses, No pulsatile masses. No peritoneal signs.  Skin: Warm, Dry, No erythema, No rash.   Back: No midline bony tenderness.  Reproducible tenderness to the bilateral thoracic paraspinous muscles, between and below the shoulder blades, without visible or palpable abnormality or skin changes.  Extremities: Intact distal pulses, No edema, No cyanosis.  Musculoskeletal: Good range of motion in all major joints. No or major deformities noted.   Neurologic: Alert , Normal motor function, Normal sensory function, No focal deficits noted.   Psychiatric: Affect anxious, judgment normal, Mood normal.     DIAGNOSTIC STUDIES / PROCEDURES    EKG  Interpreted by me    Rhythm:  Normal sinus rhythm   Rate: 71  Axis: normal  Intervals: normal  Ectopy: none  Conduction: normal  ST Segments: no acute change  T Waves: no acute change  Q Waves: none  No old EKG.    LABS  Labs Reviewed   CBC WITH DIFFERENTIAL - Abnormal; Notable for the following: "        Result Value    WBC 4.6 (*)     All other components within normal limits   COMP METABOLIC PANEL - Abnormal; Notable for the following:     Sodium 133 (*)     All other components within normal limits   BTYPE NATRIURETIC PEPTIDE   TROPONIN   ESTIMATED GFR     All labs reviewed by me.    RADIOLOGY  DX-CHEST-PORTABLE (1 VIEW)    (Results Pending)     The radiologist's interpretation of all radiological studies have been reviewed by me.    COURSE & MEDICAL DECISION MAKING  Nursing notes, VS, PMSFHx reviewed in chart.     2:31 PM Patient seen and examined at bedside. Differential diagnosis includes but is not limited to ACS, pneumonia, upper respiratory virus, musculoskeletal pain, anxiety.  This patient has had 2 weeks of symptoms of chest tightness with some throat tightness, very atypical symptoms, in terms of only being worse sometimes when she eats.  This could be GERD related.  She seems anxious, almost tearful, so anxiety is certainly in the differential as well.  She needs to be ruled out for acute coronary syndrome, with 2 weeks of symptoms, I think a single troponin should be adequate.  Ordered for EKG, chest x-ray, cardiac monitoring, and screening laboratory tests to evaluate. Patient will be treated with a GI coctail and ASA for her symptoms.     2:58 PM this patient reports complete resolution of pain after GI cocktail.  Fluids important not to use this as a diagnostic maneuver, I am glad she feels better, and it is at least consistent with her history of GERD, in the setting of very atypical chest discomfort symptoms.    This patient's labs, EKG, and chest x-ray were normal.  Her symptoms resolved.  They were very atypical to begin with in terms of acute coronary syndrome, and there is no sign of a dangerous medical condition.  I think this may be GI in nature, though have given her close return precautions, and recommended that she follow-up with her primary care doctor.  We discussed dietary  precautions for reflux symptoms.     The patient will return for new or worsening symptoms and is stable at the time of discharge.    The patient is referred to a primary physician for blood pressure management, diabetic screening, and for all other preventative health concerns.      DISPOSITION:  Patient will be discharged home in stable condition.    FOLLOW UP:  Loida Noble A.P.R.N.  [address]  [address]  [place] [address]  [phone]    Schedule an appointment as soon as possible for a visit         OUTPATIENT MEDICATIONS:  Discharge Medication List as of 11/12/2018  4:06 PM            FINAL IMPRESSION  1. Atypical chest pain Temporary

## 2018-11-12 NOTE — DISCHARGE INSTRUCTIONS
Las pruebas de laboratorio, el electrocardiograma y la radiografía de tórax fueron normales. No hay signos de ananda causa peligrosa de kyara síntomas. Esta incomodidad puede deberse a indigestión, estrés físico o emocional o dolor en la pared torácica. Observe kyara síntomas, evite los alimentos grasos o picantes y steven un seguimiento con clifton médico de atención primaria. Regrese al departamento de emergencias por empeoramiento o síntomas severos.    (Your laboratory tests, EKG, and chest x-ray were all normal.  There is no sign of a dangerous cause of your symptoms.  This discomfort may be from indigestion, physical or emotional stress, or pain in your chest wall.  Please keep an eye on your symptoms, avoid fatty or spicy foods, and follow-up with your primary care doctor.  Return to the emergency department for worsening or severe symptoms.)

## 2018-11-13 NOTE — ED NOTES
DC instructions given to pt/family. Verbalized understanding. Pt steady on feet with 0 s/s distress noted. Pt dcd home with family.

## 2018-11-20 ENCOUNTER — OFFICE VISIT (OUTPATIENT)
Dept: MEDICAL GROUP | Facility: MEDICAL CENTER | Age: 48
End: 2018-11-20
Payer: COMMERCIAL

## 2018-11-20 VITALS
SYSTOLIC BLOOD PRESSURE: 110 MMHG | OXYGEN SATURATION: 100 % | DIASTOLIC BLOOD PRESSURE: 66 MMHG | HEART RATE: 69 BPM | BODY MASS INDEX: 25.61 KG/M2 | RESPIRATION RATE: 16 BRPM | HEIGHT: 64 IN | TEMPERATURE: 98.7 F | WEIGHT: 150 LBS

## 2018-11-20 DIAGNOSIS — K21.9 GASTROESOPHAGEAL REFLUX DISEASE, ESOPHAGITIS PRESENCE NOT SPECIFIED: ICD-10-CM

## 2018-11-20 DIAGNOSIS — R07.89 ATYPICAL CHEST PAIN: ICD-10-CM

## 2018-11-20 DIAGNOSIS — Z12.11 COLON CANCER SCREENING: ICD-10-CM

## 2018-11-20 DIAGNOSIS — R13.12 OROPHARYNGEAL DYSPHAGIA: ICD-10-CM

## 2018-11-20 DIAGNOSIS — K59.00 CONSTIPATION, UNSPECIFIED CONSTIPATION TYPE: ICD-10-CM

## 2018-11-20 PROCEDURE — 99214 OFFICE O/P EST MOD 30 MIN: CPT | Performed by: NURSE PRACTITIONER

## 2018-11-20 RX ORDER — DOCUSATE SODIUM 100 MG/1
100 CAPSULE, LIQUID FILLED ORAL 2 TIMES DAILY PRN
Qty: 60 CAP | Refills: 2 | Status: SHIPPED | OUTPATIENT
Start: 2018-11-20 | End: 2021-05-25

## 2018-11-20 RX ORDER — POLYETHYLENE GLYCOL 3350 17 G/17G
POWDER, FOR SOLUTION ORAL
Qty: 1 BOTTLE | Refills: 11 | Status: SHIPPED
Start: 2018-11-20 | End: 2020-06-15

## 2018-11-20 RX ORDER — PANTOPRAZOLE SODIUM 40 MG/1
40 TABLET, DELAYED RELEASE ORAL DAILY
Qty: 90 TAB | Refills: 3 | Status: SHIPPED | OUTPATIENT
Start: 2018-11-20 | End: 2018-11-20

## 2018-11-20 RX ORDER — OMEPRAZOLE 20 MG/1
20 CAPSULE, DELAYED RELEASE ORAL
Qty: 90 CAP | Refills: 3 | Status: SHIPPED
Start: 2018-11-20 | End: 2020-06-15

## 2018-11-20 NOTE — LETTER
[facility]  AGUSTÍN Leach.  [address]  [facility] [address]  Fax: [phone]   Authorization for Release/Disclosure of   Protected Health Information   Name: JAMILAH RYDER : 1970 SSN: [id number]   Address: [address] [place] [address] Phone:    [phone] (home)    I authorize the entity listed below to release/disclose the PHI below to:   [facility]/DAYANA Leach and DAYANA Leach   Provider or Entity Name:  [facility]   Address   City, State, Zip  [facility]  Phone:      Fax:     Reason for request: continuity of care   Information to be released:    [  ] LAST COLONOSCOPY,  including any PATH REPORT and follow-up  [  ] LAST FIT/COLOGUARD RESULT [  ] LAST DEXA  [X]LAST MAMMOGRAM  [  ] LAST PAP  [  ] LAST LABS [  ] RETINA EXAM REPORT  [  ] IMMUNIZATION RECORDS  [  ] Release all info      [  ] Check here and initial the line next to each item to release ALL health information INCLUDING  _____ Care and treatment for drug and / or alcohol abuse  _____ HIV testing, infection status, or AIDS  _____ Genetic Testing    DATES OF SERVICE OR TIME PERIOD TO BE DISCLOSED: _____________  I understand and acknowledge that:  * This Authorization may be revoked at any time by you in writing, except if your health information has already been used or disclosed.  * Your health information that will be used or disclosed as a result of you signing this authorization could be re-disclosed by the recipient. If this occurs, your re-disclosed health information may no longer be protected by State or Federal laws.  * You may refuse to sign this Authorization. Your refusal will not affect your ability to obtain treatment.  * This Authorization becomes effective upon signing and will  on (date) __________.      If no date is indicated, this Authorization will  one (1) year from the signature date.    Name: Jamilah Elizabeth  Halima    Signature:   Date:     11/20/2018       PLEASE FAX REQUESTED RECORDS BACK TO: [phone]

## 2018-11-20 NOTE — LETTER
[facility]  AGUSTÍN Leach.  [address]  [facility] [address]  Fax: [phone]   Authorization for Release/Disclosure of   Protected Health Information   Name: JAMILAH SHAVERFAINA : 1970 SSN: [id number]   Address: [address] [place] [address] Phone:    [phone] (home)    I authorize the entity listed below to release/disclose the PHI below to:   [facility]/DAYANA Leach and DAYANA Leach   Provider or Entity Name:  Dr Valdes   Address   City, State, [facility] Phone:      Fax:     Reason for request: continuity of care   Information to be released:    [  ] LAST COLONOSCOPY,  including any PATH REPORT and follow-up  [  ] LAST FIT/COLOGUARD RESULT [  ] LAST DEXA  [  ] LAST MAMMOGRAM  [X] LAST PAP  [  ] LAST LABS [  ] RETINA EXAM REPORT  [  ] IMMUNIZATION RECORDS  [  ] Release all info      [  ] Check here and initial the line next to each item to release ALL health information INCLUDING  _____ Care and treatment for drug and / or alcohol abuse  _____ HIV testing, infection status, or AIDS  _____ Genetic Testing    DATES OF SERVICE OR TIME PERIOD TO BE DISCLOSED: _____________  I understand and acknowledge that:  * This Authorization may be revoked at any time by you in writing, except if your health information has already been used or disclosed.  * Your health information that will be used or disclosed as a result of you signing this authorization could be re-disclosed by the recipient. If this occurs, your re-disclosed health information may no longer be protected by State or Federal laws.  * You may refuse to sign this Authorization. Your refusal will not affect your ability to obtain treatment.  * This Authorization becomes effective upon signing and will  on (date) __________.      If no date is indicated, this Authorization will  one (1) year from the signature date.    Name: Jamilah Bearden    Signature:   Date:     11/20/2018       PLEASE FAX REQUESTED RECORDS BACK TO: [phone]

## 2018-11-20 NOTE — PROGRESS NOTES
CC: Follow-Up (ER, chest pain)        HPI:     Jamilah presents today for the followin. Atypical chest pain  Here following up from an ER visit last week where she presented with which she describes as pressure, not pain, substernally that made it feel difficult to take a deep breath.  She was evaluated there with a normal EKG negative troponin.  Negative BNP as well as further negative CBC and can panel.  Currently the symptoms are resolved she would attributes this to taking it over-the-counter omeprazole 20 mg daily    2. Gastroesophageal reflux disease, esophagitis presence not specified/Oropharyngeal dysphagia  Currently on over-the-counter omeprazole 20 mg daily .  Has been taking this for a few weeks and states that now she no longer has has the above-stated symptoms she has been on this medication.  She states she also was having an issue where she felt it was difficult to swallow things specifically rice or greasy foods.  She feels that this has improved slightly but is still present.  She is still able to eat.  She does not choke on saliva.  She has no black or bloody stools.      4. Constipation, unspecified constipation type   problems with constipation.  Will have hard stools.  Does do a smoothie in the morning with fruits and vegetables which helps a little bit.  No reports of black or bloody stools.    5. Colon cancer screening  Requesting referral for colon cancer screening    Current Outpatient Prescriptions   Medication Sig Dispense Refill   • pantoprazole (PROTONIX) 40 MG Tablet Delayed Response Take 1 Tab by mouth every day. 90 Tab 3   • docusate sodium (COLACE) 100 MG Cap Take 1 Cap by mouth 2 times a day as needed for Constipation. 60 Cap 2   • polyethylene glycol 3350 (MIRALAX) Powder 0.5 tbsp PO with water QD PRn constipation 1 Bottle 11   • fluticasone (FLONASE) 50 MCG/ACT nasal spray Spray 1 Spray in nose PRN.     • naproxen (NAPROSYN) 500 MG Tab Take 500 mg by mouth 2 times a day,  "with meals.     • naproxen (ALEVE) 220 MG tablet Take 440 mg by mouth as needed.       No current facility-administered medications for this visit.      Social History   Substance Use Topics   • Smoking status: Never Smoker   • Smokeless tobacco: Never Used   • Alcohol use No     I reviewed patients allergies, problem list and medications today in EPIC.    ROS: Any/all pertinent positives listed in the HPI, otherwise all others reviewed are negative today.      /66 (BP Location: Left arm)   Pulse 69   Temp 37.1 °C (98.7 °F)   Resp 16   Ht 1.626 m (5' 4\")   Wt 68 kg (150 lb)   LMP 03/01/2015   SpO2 100%   BMI 25.75 kg/m²     Exam:    Gen: Alert and oriented, No apparent distress. WDWN  Psych: A+Ox3, normal affect and mood  Skin: Warm, dry and intact. Good turgor   No rashes in visible areas.  Eye: Conjunctiva clear, lids normal  ENMT: Lips without lesions, good dentition  Lungs: Clear to auscultation bilaterally, no rales or rhonchi   Unlabored respiratory effort.   CV: Regular rate and rhythm, S1, S2. No murmurs.   No Edema  Abd: Soft non tender, non distended. Normal active bowel sounds.    No Hepatosplenomegaly, No pulsatile masses.   Ext: No clubbing, cyanosis, edema.       Assessment and Plan.   48 y.o. female with the following issues.    1. Atypical chest pain  Suspect related to #2.  Resolved now that she is on a PPI.  Continue this  - H.PYLORI STOOL ANTIGEN; Future    2. Gastroesophageal reflux disease, esophagitis presence not specified/ Oropharyngeal dysphagia  Currently improved on PPI.  New prescription sent for the same medication.  Will rule out H. pylori.  - H.PYLORI STOOL ANTIGEN; Future  - REFERRAL TO GASTROENTEROLOGY    4. Constipation, unspecified constipation type  Stable.  Given a list of high-fiber foods.  We will do a trial of Colace and MiraLAX.  Discussed she can wean off of these if she is increasing fiber noticing she is having more regular bowel movements  - docusate sodium " (COLACE) 100 MG Cap; Take 1 Cap by mouth 2 times a day as needed for Constipation.  Dispense: 60 Cap; Refill: 2  - polyethylene glycol 3350 (MIRALAX) Powder; 0.5 tbsp PO with water QD PRn constipation  Dispense: 1 Bottle; Refill: 11    5. Colon cancer screening  Routine referral placed  - REFERRAL TO GASTROENTEROLOGY

## 2018-11-28 ENCOUNTER — HOSPITAL ENCOUNTER (OUTPATIENT)
Facility: MEDICAL CENTER | Age: 48
End: 2018-11-28
Attending: NURSE PRACTITIONER
Payer: COMMERCIAL

## 2018-11-28 PROCEDURE — 87338 HPYLORI STOOL AG IA: CPT

## 2018-11-29 DIAGNOSIS — K21.9 GASTROESOPHAGEAL REFLUX DISEASE, ESOPHAGITIS PRESENCE NOT SPECIFIED: ICD-10-CM

## 2018-11-29 DIAGNOSIS — R07.89 ATYPICAL CHEST PAIN: ICD-10-CM

## 2018-11-29 DIAGNOSIS — R13.12 OROPHARYNGEAL DYSPHAGIA: ICD-10-CM

## 2018-11-29 LAB — H PYLORI AG STL QL IA: NOT DETECTED

## 2019-02-13 ENCOUNTER — OFFICE VISIT (OUTPATIENT)
Dept: MEDICAL GROUP | Facility: MEDICAL CENTER | Age: 49
End: 2019-02-13
Payer: COMMERCIAL

## 2019-02-13 VITALS
WEIGHT: 151 LBS | HEART RATE: 60 BPM | SYSTOLIC BLOOD PRESSURE: 116 MMHG | DIASTOLIC BLOOD PRESSURE: 66 MMHG | TEMPERATURE: 97.3 F | BODY MASS INDEX: 25.78 KG/M2 | OXYGEN SATURATION: 99 % | HEIGHT: 64 IN

## 2019-02-13 DIAGNOSIS — M54.41 ACUTE RIGHT-SIDED LOW BACK PAIN WITH RIGHT-SIDED SCIATICA: ICD-10-CM

## 2019-02-13 PROCEDURE — 99213 OFFICE O/P EST LOW 20 MIN: CPT | Performed by: INTERNAL MEDICINE

## 2019-02-13 NOTE — LETTER
February 13, 2019        Jamilah Elizabeth Dannycarl Butts would benefit from light duty and no lifting for 1 week.                         Yazan Whitfield MD

## 2019-02-14 DIAGNOSIS — G56.01 CARPAL TUNNEL SYNDROME OF RIGHT WRIST: ICD-10-CM

## 2019-02-14 DIAGNOSIS — R20.0 NUMBNESS OF FINGERS: ICD-10-CM

## 2019-02-15 RX ORDER — NAPROXEN 500 MG/1
TABLET ORAL
Qty: 60 TAB | Refills: 0 | Status: SHIPPED | OUTPATIENT
Start: 2019-02-15 | End: 2020-06-15

## 2019-04-03 ENCOUNTER — HOSPITAL ENCOUNTER (OUTPATIENT)
Dept: RADIOLOGY | Facility: MEDICAL CENTER | Age: 49
End: 2019-04-03

## 2019-04-04 ENCOUNTER — HOSPITAL ENCOUNTER (OUTPATIENT)
Dept: RADIOLOGY | Facility: MEDICAL CENTER | Age: 49
End: 2019-04-04
Attending: OBSTETRICS & GYNECOLOGY
Payer: COMMERCIAL

## 2019-04-04 DIAGNOSIS — Z12.31 VISIT FOR SCREENING MAMMOGRAM: ICD-10-CM

## 2019-04-04 PROCEDURE — 77067 SCR MAMMO BI INCL CAD: CPT

## 2019-09-23 ENCOUNTER — OFFICE VISIT (OUTPATIENT)
Dept: MEDICAL GROUP | Facility: MEDICAL CENTER | Age: 49
End: 2019-09-23
Payer: COMMERCIAL

## 2019-09-23 VITALS
SYSTOLIC BLOOD PRESSURE: 112 MMHG | OXYGEN SATURATION: 99 % | BODY MASS INDEX: 25.27 KG/M2 | RESPIRATION RATE: 14 BRPM | HEART RATE: 68 BPM | WEIGHT: 148 LBS | DIASTOLIC BLOOD PRESSURE: 70 MMHG | TEMPERATURE: 97.5 F | HEIGHT: 64 IN

## 2019-09-23 DIAGNOSIS — Z13.220 SCREENING, LIPID: ICD-10-CM

## 2019-09-23 DIAGNOSIS — J30.2 SEASONAL ALLERGIES: ICD-10-CM

## 2019-09-23 DIAGNOSIS — Z13.29 SCREENING FOR THYROID DISORDER: ICD-10-CM

## 2019-09-23 DIAGNOSIS — Z13.21 ENCOUNTER FOR VITAMIN DEFICIENCY SCREENING: ICD-10-CM

## 2019-09-23 DIAGNOSIS — R05.9 COUGH: ICD-10-CM

## 2019-09-23 DIAGNOSIS — R09.82 PND (POST-NASAL DRIP): ICD-10-CM

## 2019-09-23 DIAGNOSIS — G56.01 CARPAL TUNNEL SYNDROME OF RIGHT WRIST: ICD-10-CM

## 2019-09-23 PROCEDURE — 99214 OFFICE O/P EST MOD 30 MIN: CPT | Performed by: NURSE PRACTITIONER

## 2019-09-23 RX ORDER — LORATADINE 10 MG/1
10 TABLET ORAL DAILY
Qty: 30 TAB | Refills: 2 | Status: SHIPPED | OUTPATIENT
Start: 2019-09-23 | End: 2023-06-12

## 2019-09-23 RX ORDER — BENZONATATE 100 MG/1
100-200 CAPSULE ORAL 3 TIMES DAILY PRN
Qty: 60 CAP | Refills: 0 | Status: SHIPPED | OUTPATIENT
Start: 2019-09-23 | End: 2020-06-15

## 2019-09-23 RX ORDER — FLUTICASONE PROPIONATE 50 MCG
2 SPRAY, SUSPENSION (ML) NASAL DAILY
Qty: 1 BOTTLE | Refills: 11 | Status: SHIPPED | OUTPATIENT
Start: 2019-09-23 | End: 2020-06-15 | Stop reason: SDUPTHER

## 2019-09-23 ASSESSMENT — PATIENT HEALTH QUESTIONNAIRE - PHQ9: CLINICAL INTERPRETATION OF PHQ2 SCORE: 0

## 2019-09-24 NOTE — PROGRESS NOTES
CC: Cough (x 1 month, worse in the morning with green phlegm, no fever, ear pain)        HPI:     Jamilah presents today for the followin. Cough/PND (post-nasal drip)  Here primarily today stating that in July she came down with a little cold including runny nose and some drainage.  She states she is been feeling okay other than she continues to have a cough.  The cough is worse in the morning and will be associated with phlegm at that time.  We will also return in the evening time.  Not specifically worse with supine.  She has since had some changes including loss of voice intermittently.  No associated fevers.  She states she does have some bilateral intermittent ear pain and itching.  More itching than pain.  Has not tried any over-the-counter medications at this point    3. Carpal tunnel syndrome of right wrist  Has a history of recurrent carpal tunnel.  She status post surgery for this locally by Dr. Holley.  Return to symptoms.  No improvement recently with repeat steroid injections.  She was told by her hand surgeon that he would only able to do the surgery one more time after that she would no longer be a candidate for it.  She does use her wrist splints routinely.  She is had no improvement with anti-inflammatories or oral steroids.  At this point she is waiting to see if her symptoms become little worse and then she will go for surgery.  Their question today is that she qualifies for disability given physical activity and repetitive motions at work contribute to her carpal tunnel and concerns what would happen if she had the surgery and it returned.      Current Outpatient Medications   Medication Sig Dispense Refill   • fluticasone (FLONASE) 50 MCG/ACT nasal spray Spray 2 Sprays in nose every day. 1 Bottle 11   • loratadine (CLARITIN) 10 MG Tab Take 1 Tab by mouth every day. 30 Tab 2   • benzonatate (TESSALON) 100 MG Cap Take 1-2 Caps by mouth 3 times a day as needed for Cough. 60 Cap 0   •  "naproxen (NAPROSYN) 500 MG Tab TAKE ONE TABLET BY MOUTH TWICE A DAY WITH MEALS FOR 7 DAYS 60 Tab 0   • docusate sodium (COLACE) 100 MG Cap Take 1 Cap by mouth 2 times a day as needed for Constipation. 60 Cap 2   • polyethylene glycol 3350 (MIRALAX) Powder 0.5 tbsp PO with water QD PRn constipation (Patient not taking: Reported on 2/13/2019) 1 Bottle 11   • omeprazole (PRILOSEC) 20 MG delayed-release capsule Take 1 Cap by mouth every morning before breakfast. (Patient not taking: Reported on 2/13/2019) 90 Cap 3   • fluticasone (FLONASE) 50 MCG/ACT nasal spray Spray 1 Spray in nose PRN.       No current facility-administered medications for this visit.      Social History     Tobacco Use   • Smoking status: Never Smoker   • Smokeless tobacco: Never Used   Substance Use Topics   • Alcohol use: No     Alcohol/week: 0.0 oz   • Drug use: No     I reviewed patients allergies, problem list and medications today in EPIC.    ROS: Any/all pertinent positives listed in the HPI, otherwise all others reviewed are negative today.      /70 (BP Location: Left arm, Patient Position: Sitting, BP Cuff Size: Adult)   Pulse 68   Temp 36.4 °C (97.5 °F) (Temporal)   Resp 14   Ht 1.626 m (5' 4\")   Wt 67.1 kg (148 lb)   LMP 03/01/2015   SpO2 99%   BMI 25.40 kg/m²     Exam:    Gen: Alert and oriented, No apparent distress. WDWN  Psych: A+Ox3, normal affect and mood  Skin: Warm, dry and intact. Good turgor   No rashes in visible areas.  Eye: Conjunctiva clear, lids normal  ENMT: Lips without lesions, good dentition   Oropharynx clear.  Bilateral TMs intact nonerythematous.  Moderate to severe erythema bilateral nasal turbinates.  Neck: No Lymphadenopathy, Thyromegaly, Bruits.   Trachea midline, no masses  Lungs: Clear to auscultation bilaterally, no rales or rhonchi   Unlabored respiratory effort.   CV: Regular rate and rhythm, S1, S2. No murmurs.   No Edema        Assessment and Plan.   49 y.o. female with the following " issues.    1. Cough/ PND (post-nasal drip)/Seasonal allergies  Stable.  Exams point allergies as the cause.  Will start Flonase, 2 sprays each nostril daily for 2 weeks, start Claritin as she does not feel well she takes Zyrtec.  Daily for 2 weeks.  Tessalon Perles to help with cough.  At this point I do not see an indication for antibiotics however they will let me know by my chart or by phone if her symptoms worsen or change  - fluticasone (FLONASE) 50 MCG/ACT nasal spray; Spray 2 Sprays in nose every day.  Dispense: 1 Bottle; Refill: 11  - loratadine (CLARITIN) 10 MG Tab; Take 1 Tab by mouth every day.  Dispense: 30 Tab; Refill: 2  - benzonatate (TESSALON) 100 MG Cap; Take 1-2 Caps by mouth 3 times a day as needed for Cough.  Dispense: 60 Cap; Refill: 0    4. Carpal tunnel syndrome of right wrist  This is chronic within a current exacerbation.  Status post 1 surgery.  Followed by Dr. Holley.  At this point I am unsure if she would qualify for disability given she has a current treatment that we know works for her although the question is how long lasting.  Discussed it may work if she gets a letter of support from Dr. Holley that returning to any sort of work requiring repetitive motions of the hands will cause a recurrence of her hand symptoms and that he does not recommend that she work.  - Comp Metabolic Panel; Future  - CBC WITH DIFFERENTIAL; Future    5. Screening, lipid  Ordered  - Lipid Profile; Future    6. Screening for thyroid disorder  Ordered  - TSH; Future    7. Encounter for vitamin deficiency screening  Ordered routinely  - VITAMIN D,25 HYDROXY; Future

## 2019-11-18 ENCOUNTER — OFFICE VISIT (OUTPATIENT)
Dept: MEDICAL GROUP | Facility: MEDICAL CENTER | Age: 49
End: 2019-11-18
Payer: COMMERCIAL

## 2019-11-18 VITALS
RESPIRATION RATE: 14 BRPM | DIASTOLIC BLOOD PRESSURE: 66 MMHG | TEMPERATURE: 97.1 F | HEIGHT: 64 IN | OXYGEN SATURATION: 99 % | WEIGHT: 146 LBS | SYSTOLIC BLOOD PRESSURE: 104 MMHG | BODY MASS INDEX: 24.92 KG/M2 | HEART RATE: 67 BPM

## 2019-11-18 DIAGNOSIS — M54.32 SCIATICA OF LEFT SIDE: ICD-10-CM

## 2019-11-18 PROCEDURE — 99214 OFFICE O/P EST MOD 30 MIN: CPT | Performed by: NURSE PRACTITIONER

## 2019-11-18 RX ORDER — TIZANIDINE 4 MG/1
2-4 TABLET ORAL NIGHTLY PRN
Qty: 30 TAB | Refills: 0 | Status: SHIPPED | OUTPATIENT
Start: 2019-11-18 | End: 2020-06-15

## 2019-11-18 RX ORDER — METHYLPREDNISOLONE 4 MG/1
TABLET ORAL
Qty: 21 TAB | Refills: 0 | Status: SHIPPED | OUTPATIENT
Start: 2019-11-18 | End: 2020-06-15

## 2019-11-18 NOTE — LETTER
November 18, 2019         Patient: Jamilah Bearden   YOB: 1970   Date of Visit: 11/18/2019           To Whom it May Concern:    Jamilah Varghese was seen in my clinic on 11/18/2019.    If you have any questions or concerns, please don't hesitate to call.        Sincerely,           DAYANA Leach  Electronically Signed

## 2019-11-19 NOTE — PROGRESS NOTES
"CC: Back Pain (lower back pain x last Wednesday; sometimes pain is on side of left hip)        HPI:     Jamilah presents today for the followin. Sciatica of left side  Here today stating last Tuesday approximately 6 days ago she began with an acute onset of some pain in her bilateral low back.  Describes it largely as \"cramping\" and feeling tight.  Interim she is been using heat patches stretches as well as over-the-counter Aleve up to 3 pills daily and has been getting some improvement specifically when she rested on Saturday and .  However currently is still feels pretty tight over the left lower back and around to the front of her hip.      Was seen within this year for symptoms on the right side.  No associated injury or trauma  Has never had imaging    Current Outpatient Medications   Medication Sig Dispense Refill   • tizanidine (ZANAFLEX) 4 MG Tab Take 0.5-1 Tabs by mouth at bedtime as needed. 30 Tab 0   • methylPREDNISolone (MEDROL DOSEPAK) 4 MG Tablet Therapy Pack As directed on the packaging label. 21 Tab 0   • fluticasone (FLONASE) 50 MCG/ACT nasal spray Spray 2 Sprays in nose every day. 1 Bottle 11   • loratadine (CLARITIN) 10 MG Tab Take 1 Tab by mouth every day. 30 Tab 2   • benzonatate (TESSALON) 100 MG Cap Take 1-2 Caps by mouth 3 times a day as needed for Cough. 60 Cap 0   • naproxen (NAPROSYN) 500 MG Tab TAKE ONE TABLET BY MOUTH TWICE A DAY WITH MEALS FOR 7 DAYS 60 Tab 0   • docusate sodium (COLACE) 100 MG Cap Take 1 Cap by mouth 2 times a day as needed for Constipation. 60 Cap 2   • polyethylene glycol 3350 (MIRALAX) Powder 0.5 tbsp PO with water QD PRn constipation (Patient not taking: Reported on 2019) 1 Bottle 11   • omeprazole (PRILOSEC) 20 MG delayed-release capsule Take 1 Cap by mouth every morning before breakfast. (Patient not taking: Reported on 2019) 90 Cap 3   • fluticasone (FLONASE) 50 MCG/ACT nasal spray Spray 1 Spray in nose PRN.       No current " "facility-administered medications for this visit.      Social History     Tobacco Use   • Smoking status: Never Smoker   • Smokeless tobacco: Never Used   Substance Use Topics   • Alcohol use: No     Alcohol/week: 0.0 oz   • Drug use: No     I reviewed patients allergies, problem list and medications today in EPIC.    ROS: Any/all pertinent positives listed in the HPI, otherwise all others reviewed are negative today.      /66 (BP Location: Left arm, Patient Position: Sitting, BP Cuff Size: Adult)   Pulse 67   Temp 36.2 °C (97.1 °F) (Temporal)   Resp 14   Ht 1.626 m (5' 4\")   Wt 66.2 kg (146 lb)   LMP 03/01/2015   SpO2 99%   BMI 25.06 kg/m²     Exam:    Gen: Alert and oriented, No apparent distress. WDWN  Psych: A+Ox3, normal affect and mood  Skin: Warm, dry and intact. Good turgor   No rashes in visible areas.  Eye: Conjunctiva clear, lids normal  ENMT: Lips without lesions, good dentition  Lungs: Clear to auscultation bilaterally, no rales or rhonchi   Unlabored respiratory effort.   CV: Regular rate and rhythm, S1, S2. No murmurs.   No Edema  Possible very mild left-sided straight leg raise  None on the right  Tenderness with palpation over the lumbar sacral area.  None over the SI joints.  Normal gait      Assessment and Plan.   49 y.o. female with the following issues.    1. Sciatica of left side  Chronically intermittent.  Was given exercises to do at home to strengthen lower back.  We may consider formal therapy if not improving.  Trial of tizanidine half or whole tablet at night, understands she cannot drive with this.  Will switch to Medrol Dosepak to see if this gives her greater symptom resolution.  She understands while taking the Medrol Dosepak she cannot use ibuprofen or naproxen products.  She may use Tylenol if needed.  Continue heat therapy at home.  Given a note that she was here at work  - tizanidine (ZANAFLEX) 4 MG Tab; Take 0.5-1 Tabs by mouth at bedtime as needed.  Dispense: 30 " Tab; Refill: 0  - methylPREDNISolone (MEDROL DOSEPAK) 4 MG Tablet Therapy Pack; As directed on the packaging label.  Dispense: 21 Tab; Refill: 0

## 2020-06-15 ENCOUNTER — TELEMEDICINE (OUTPATIENT)
Dept: MEDICAL GROUP | Facility: MEDICAL CENTER | Age: 50
End: 2020-06-15
Payer: COMMERCIAL

## 2020-06-15 VITALS — WEIGHT: 146 LBS | HEIGHT: 64 IN | BODY MASS INDEX: 24.92 KG/M2

## 2020-06-15 DIAGNOSIS — G47.00 INSOMNIA, UNSPECIFIED TYPE: ICD-10-CM

## 2020-06-15 DIAGNOSIS — N95.1 MENOPAUSAL SYMPTOMS: ICD-10-CM

## 2020-06-15 DIAGNOSIS — R09.81 NASAL CONGESTION: ICD-10-CM

## 2020-06-15 DIAGNOSIS — R51.9 LEFT-SIDED HEADACHE: ICD-10-CM

## 2020-06-15 PROCEDURE — 99442 PR PHYSICIAN TELEPHONE EVALUATION 11-20 MIN: CPT | Mod: CR | Performed by: NURSE PRACTITIONER

## 2020-06-15 RX ORDER — TRAZODONE HYDROCHLORIDE 50 MG/1
50 TABLET ORAL NIGHTLY PRN
Qty: 30 TAB | Refills: 2 | Status: SHIPPED | OUTPATIENT
Start: 2020-06-15 | End: 2020-08-05 | Stop reason: SDUPTHER

## 2020-06-15 RX ORDER — FLUTICASONE PROPIONATE 50 MCG
2 SPRAY, SUSPENSION (ML) NASAL DAILY
Qty: 1 BOTTLE | Refills: 11 | Status: SHIPPED | OUTPATIENT
Start: 2020-06-15 | End: 2022-02-17

## 2020-06-15 RX ORDER — SERTRALINE HYDROCHLORIDE 25 MG/1
25 TABLET, FILM COATED ORAL DAILY
COMMUNITY
End: 2022-04-27

## 2020-06-15 ASSESSMENT — FIBROSIS 4 INDEX: FIB4 SCORE: 1.077720502487301427

## 2020-06-16 NOTE — PROGRESS NOTES
"    Telephone Appointment Visit   As a means of avoiding spread of COVID-19, this visit is being conducted by telephone. This telephone visit was initiated by the patient and they verbally consented.  Converted to telephone visit due to persistent IT problems despite troubleshooting.  Per patient \"poor Internet connection.\"  Telephone appointment done in patient's native language without the need of  Salvadorean    Time at start of call: 5:38pm    Reason for Call:  Symptom Follow-up    HPI:    1. Nasal congestion/. Left-sided headache  States about 2 months ago she began seeing gynecology related to issues that could have been perimenopausal.  However about 2 months ago she was put on amitriptyline to help with sleep and feels that this may contributed to some left-sided headaches.  States they are there daily however they are typically worse in the middle of the night.  They will be gone in the middle of the afternoon.  They resolved with thousand milligrams of over-the-counter Tylenol.  States the pain goes from behind her left eye down to behind her left ear.  Not associated with injury or trauma.  Not associated with vision changes or other concerning symptoms.  Does cause issues with sleeping however because she will wake up in the middle the night with pain.  Historically her BP has been normal, may have not been checked since her last appointment with me about 6 months ago.  States she does check her BP at home and states is been \"high\"    Does have associated nasal congestion and a history of seasonal allergies.  Previously on Flonase, needs a refill.    3. Menopausal symptoms  Did see her gynecologist regarding menopausal symptoms that she describes as being sensitive sad irritable tearful.  Also with coexisting issues with insomnia.  She is status post ELISABETH with BSO many years ago.  Has a contraindication to using synthetic hormones due to family history of cancers  Is now currently on sertraline 25 " "mg for approximately a week.      4. Insomnia, unspecified type  States long-lasting insomnia.  Had tried melatonin 3 mg over-the-counter with no relief.  Use some \"drops from [place]\" that may have helped unknown what was inside of these.  Did consult with her gynecologist and she was put on amitriptyline.  States this gave her headaches so this was stopped.       Labs / Images Reviewed:   Not applicable    Assessment and Plan:     1. Nasal congestion  May be contributing symptoms to her left-sided congestion and headaches.  Reordered Flonase to do 2 sprays each nostril daily for a minimum of 1 week.  - fluticasone (FLONASE) 50 MCG/ACT nasal spray; Spray 2 Sprays in nose every day.  Dispense: 1 Bottle; Refill: 11    2. Left-sided headache  Unsure of etiology and difficult to verify without a physical exam.  Does not endorse any concerning associated symptoms.  Other than congestion (see #1).  May continue using Tylenol.  Interestingly this appeared to start when she had used amitriptyline-per patient.  If no improvement with Flonase she will follow-up in our office which will allow me to check her BP.  Unsure if BP is high at home due to machine error, true hypertension or intermittent/temporary response to pain  - fluticasone (FLONASE) 50 MCG/ACT nasal spray; Spray 2 Sprays in nose every day.  Dispense: 1 Bottle; Refill: 11    3. Menopausal symptoms  Recommend continue care with gynecologist  - sertraline (ZOLOFT) 25 MG tablet; Take 25 mg by mouth every day.    4. Insomnia, unspecified type  Trial of trazodone.  Amitriptyline did not help.  Melatonin did not help.  Reviewed to take at night.  Patient verbalized understanding  - traZODone (DESYREL) 50 MG Tab; Take 1 Tab by mouth at bedtime as needed for Sleep.  Dispense: 30 Tab; Refill: 2      Follow-up: If not improving.  Patient denied any questions.  Understands a follow-up of her symptoms are not improving as I do feel this would benefit from an exam in the " office.    Time at end of call: 5:52pm  Total Time Spent: 11-20 minutes    AGUSTÍN Small.

## 2020-06-20 ENCOUNTER — HOSPITAL ENCOUNTER (OUTPATIENT)
Dept: LAB | Facility: MEDICAL CENTER | Age: 50
End: 2020-06-20
Attending: NURSE PRACTITIONER
Payer: COMMERCIAL

## 2020-06-20 DIAGNOSIS — Z13.29 SCREENING FOR THYROID DISORDER: ICD-10-CM

## 2020-06-20 DIAGNOSIS — Z13.21 ENCOUNTER FOR VITAMIN DEFICIENCY SCREENING: ICD-10-CM

## 2020-06-20 DIAGNOSIS — Z13.220 SCREENING, LIPID: ICD-10-CM

## 2020-06-20 DIAGNOSIS — G56.01 CARPAL TUNNEL SYNDROME OF RIGHT WRIST: ICD-10-CM

## 2020-06-20 LAB
25(OH)D3 SERPL-MCNC: 55 NG/ML (ref 30–100)
ALBUMIN SERPL BCP-MCNC: 4.3 G/DL (ref 3.2–4.9)
ALBUMIN/GLOB SERPL: 1.5 G/DL
ALP SERPL-CCNC: 48 U/L (ref 30–99)
ALT SERPL-CCNC: 21 U/L (ref 2–50)
ANION GAP SERPL CALC-SCNC: 9 MMOL/L (ref 7–16)
AST SERPL-CCNC: 19 U/L (ref 12–45)
BASOPHILS # BLD AUTO: 1 % (ref 0–1.8)
BASOPHILS # BLD: 0.03 K/UL (ref 0–0.12)
BILIRUB SERPL-MCNC: 0.4 MG/DL (ref 0.1–1.5)
BUN SERPL-MCNC: 12 MG/DL (ref 8–22)
CALCIUM SERPL-MCNC: 9.5 MG/DL (ref 8.5–10.5)
CHLORIDE SERPL-SCNC: 107 MMOL/L (ref 96–112)
CHOLEST SERPL-MCNC: 222 MG/DL (ref 100–199)
CO2 SERPL-SCNC: 24 MMOL/L (ref 20–33)
CREAT SERPL-MCNC: 0.63 MG/DL (ref 0.5–1.4)
EOSINOPHIL # BLD AUTO: 0.08 K/UL (ref 0–0.51)
EOSINOPHIL NFR BLD: 2.6 % (ref 0–6.9)
ERYTHROCYTE [DISTWIDTH] IN BLOOD BY AUTOMATED COUNT: 43.1 FL (ref 35.9–50)
GLOBULIN SER CALC-MCNC: 2.9 G/DL (ref 1.9–3.5)
GLUCOSE SERPL-MCNC: 86 MG/DL (ref 65–99)
HCT VFR BLD AUTO: 42.4 % (ref 37–47)
HDLC SERPL-MCNC: 58 MG/DL
HGB BLD-MCNC: 13.8 G/DL (ref 12–16)
IMM GRANULOCYTES # BLD AUTO: 0 K/UL (ref 0–0.11)
IMM GRANULOCYTES NFR BLD AUTO: 0 % (ref 0–0.9)
LDLC SERPL CALC-MCNC: 148 MG/DL
LYMPHOCYTES # BLD AUTO: 1.2 K/UL (ref 1–4.8)
LYMPHOCYTES NFR BLD: 39.3 % (ref 22–41)
MCH RBC QN AUTO: 29.3 PG (ref 27–33)
MCHC RBC AUTO-ENTMCNC: 32.5 G/DL (ref 33.6–35)
MCV RBC AUTO: 90 FL (ref 81.4–97.8)
MONOCYTES # BLD AUTO: 0.19 K/UL (ref 0–0.85)
MONOCYTES NFR BLD AUTO: 6.2 % (ref 0–13.4)
NEUTROPHILS # BLD AUTO: 1.55 K/UL (ref 2–7.15)
NEUTROPHILS NFR BLD: 50.9 % (ref 44–72)
NRBC # BLD AUTO: 0 K/UL
NRBC BLD-RTO: 0 /100 WBC
PLATELET # BLD AUTO: 205 K/UL (ref 164–446)
PMV BLD AUTO: 11.4 FL (ref 9–12.9)
POTASSIUM SERPL-SCNC: 4.3 MMOL/L (ref 3.6–5.5)
PROT SERPL-MCNC: 7.2 G/DL (ref 6–8.2)
RBC # BLD AUTO: 4.71 M/UL (ref 4.2–5.4)
SODIUM SERPL-SCNC: 140 MMOL/L (ref 135–145)
TRIGL SERPL-MCNC: 81 MG/DL (ref 0–149)
TSH SERPL DL<=0.005 MIU/L-ACNC: 2.83 UIU/ML (ref 0.38–5.33)
WBC # BLD AUTO: 3.1 K/UL (ref 4.8–10.8)

## 2020-06-20 PROCEDURE — 82306 VITAMIN D 25 HYDROXY: CPT

## 2020-06-20 PROCEDURE — 80061 LIPID PANEL: CPT

## 2020-06-20 PROCEDURE — 84443 ASSAY THYROID STIM HORMONE: CPT

## 2020-06-20 PROCEDURE — 85025 COMPLETE CBC W/AUTO DIFF WBC: CPT

## 2020-06-20 PROCEDURE — 80053 COMPREHEN METABOLIC PANEL: CPT

## 2020-06-20 PROCEDURE — 36415 COLL VENOUS BLD VENIPUNCTURE: CPT

## 2020-08-05 DIAGNOSIS — G47.00 INSOMNIA, UNSPECIFIED TYPE: ICD-10-CM

## 2020-08-06 RX ORDER — TRAZODONE HYDROCHLORIDE 50 MG/1
50 TABLET ORAL NIGHTLY PRN
Qty: 30 TAB | Refills: 2 | Status: SHIPPED | OUTPATIENT
Start: 2020-08-06 | End: 2020-10-06 | Stop reason: SDUPTHER

## 2020-10-01 ENCOUNTER — HOSPITAL ENCOUNTER (OUTPATIENT)
Dept: RADIOLOGY | Facility: MEDICAL CENTER | Age: 50
End: 2020-10-01
Attending: OBSTETRICS & GYNECOLOGY
Payer: COMMERCIAL

## 2020-10-01 DIAGNOSIS — Z12.31 VISIT FOR SCREENING MAMMOGRAM: ICD-10-CM

## 2020-10-01 PROCEDURE — 77067 SCR MAMMO BI INCL CAD: CPT

## 2020-10-06 DIAGNOSIS — G47.00 INSOMNIA, UNSPECIFIED TYPE: ICD-10-CM

## 2020-10-06 RX ORDER — TRAZODONE HYDROCHLORIDE 50 MG/1
50 TABLET ORAL NIGHTLY PRN
Qty: 30 TAB | Refills: 2 | Status: SHIPPED | OUTPATIENT
Start: 2020-10-06 | End: 2021-05-25

## 2020-10-12 NOTE — PROGRESS NOTES
"Non face to face prolonged services of clinical data and chart information reviewed for a total time of 30 performed on 10/9,10,11 for MARY BETH Varghese  Reviewed were:    Referral    Previous encounters    Imaging mammography, colonoscopy, CT/tomography, MRI, PET    Previous procedures all biopsy and surgical procedures including pathology analysis    Notes    Media all personal and family history from outside institutions including germline DNA analysis    Miscellaneous reports    Patient summaries family history as documented by referring clinician  Counseling, testing information and materials prepared  \"I spent 30 minutes  from 1930 to 2000 reviewing past records, prior to visit pertaining to genetic risk.\"   Blair Sinclair M.D.  edited  "

## 2020-10-13 ENCOUNTER — OFFICE VISIT (OUTPATIENT)
Dept: HEMATOLOGY ONCOLOGY | Facility: MEDICAL CENTER | Age: 50
End: 2020-10-13
Payer: COMMERCIAL

## 2020-10-13 VITALS
OXYGEN SATURATION: 98 % | DIASTOLIC BLOOD PRESSURE: 72 MMHG | BODY MASS INDEX: 24.73 KG/M2 | RESPIRATION RATE: 18 BRPM | SYSTOLIC BLOOD PRESSURE: 118 MMHG | HEART RATE: 78 BPM | HEIGHT: 64 IN | TEMPERATURE: 99.4 F | WEIGHT: 144.84 LBS

## 2020-10-13 DIAGNOSIS — Z80.3 FAMILY HISTORY OF BREAST CANCER: ICD-10-CM

## 2020-10-13 DIAGNOSIS — Z80.0 FAMILY HISTORY OF PANCREATIC CANCER: ICD-10-CM

## 2020-10-13 DIAGNOSIS — Z80.6 FAMILY HISTORY OF LEUKEMIA: ICD-10-CM

## 2020-10-13 PROCEDURE — 99203 OFFICE O/P NEW LOW 30 MIN: CPT | Performed by: MEDICAL GENETICS

## 2020-10-13 ASSESSMENT — FIBROSIS 4 INDEX: FIB4 SCORE: 1.05

## 2020-10-13 ASSESSMENT — PATIENT HEALTH QUESTIONNAIRE - PHQ9: CLINICAL INTERPRETATION OF PHQ2 SCORE: 0

## 2020-10-13 NOTE — PROGRESS NOTES
GENETIC RISK ASSESSMENT    Jamilah Bearden is a 50 y.o. year old patient who was referred by Lucio for cancer genetic risk assessment.    Chief Complaint: family history of cancer    HPI: The patient does not carry a cancer diagnosis  Her sister, mother, aunt, cousin x 3 all breast cancer and 2nd cousin (leukemia) and uncle (pancreatic)  No family members alive or available for testing themselves  Review of personal and family histories suggested that a cancer genetic risk assessment was in order.    Review of Systems (ROS):  Constitutional N  Eyes N  ENT N   Respiratory N  Cardiovascular N  Gastrointestinal N  Genitourinary N  Musculoskeletal N  Skin N  Neurological N  Endocrine N  Psychiatric N  Hematologic/lymphatic N  Allergic/Immunologic N  All other systems reviewed and are negative.    History (Past/Family)  Family History:   Family History   Problem Relation Age of Onset   • Heart Disease Mother         CHF/MIx2   • Hyperlipidemia Father    • Cancer Sister         BR CA   • Heart Disease Maternal Grandmother         pacemaker=placed very late in life    • Other Maternal Grandfather         parkinson   • Stroke Maternal Grandfather    • Cancer Paternal Grandfather         bone CA      3 generation pedigree built   Yes   Amber empiric risk calculation No   Harris II empiric risk calculation  No    Social History:       Social History     Socioeconomic History   • Marital status: Unknown     Spouse name: Not on file   • Number of children: Not on file   • Years of education: Not on file   • Highest education level: Not on file   Occupational History   • Not on file   Social Needs   • Financial resource strain: Not on file   • Food insecurity     Worry: Not on file     Inability: Not on file   • Transportation needs     Medical: Not on file     Non-medical: Not on file   Tobacco Use   • Smoking status: Never Smoker   • Smokeless tobacco: Never Used   Substance and Sexual Activity   • Alcohol  "use: No     Alcohol/week: 0.0 oz   • Drug use: No   • Sexual activity: Never     Partners: Male     Comment:  x 28 years   Lifestyle   • Physical activity     Days per week: Not on file     Minutes per session: Not on file   • Stress: Not on file   Relationships   • Social connections     Talks on phone: Not on file     Gets together: Not on file     Attends Congregational service: Not on file     Active member of club or organization: Not on file     Attends meetings of clubs or organizations: Not on file     Relationship status: Not on file   • Intimate partner violence     Fear of current or ex partner: Not on file     Emotionally abused: Not on file     Physically abused: Not on file     Forced sexual activity: Not on file   Other Topics Concern   • Not on file   Social History Narrative   • Not on file       Patient Past History:  Past Medical History:   Diagnosis Date   • Carpal tunnel syndrome    • GERD (gastroesophageal reflux disease)     at times with greasy food, tums or zantac,   • Pure hypercholesterolemia 8/23/2016           NCCN Testing Criteria Met                            Yes    Physical Exam  Constitutional    /72 (BP Location: Right arm, Patient Position: Sitting, BP Cuff Size: Adult)   Pulse 78   Temp 37.4 °C (99.4 °F) (Temporal)   Resp 18   Ht 1.626 m (5' 4\")   Wt 65.7 kg (144 lb 13.5 oz)   LMP 03/01/2015   SpO2 98%   BMI 24.86 kg/m²         No PE per covid protocol     Assessment and Plan:  Patient with family history of cancer    I spent a total of 30 minutes of face to face time with >50% of time spent in counseling and coordination of care discussing matters stated in above note.    Zulay panel ordered    Blair Sinclair M.D.  "

## 2020-11-09 NOTE — PROGRESS NOTES
"Non face to face prolonged services of clinical data and chart information reviewed for a total time of 30 performed on 11/6,7,8 for MARY BETH Varghese  Reviewed were:    Referral    Previous encounters    Imaging all mammography, colonoscopy, CT/tomography, MRI and PET studies    Previous procedures all surgical and biopsy procedures including pathology analysis    Notes    Media all personal and family clinical data from outside institutions including germline DNA studies    Miscellaneous reports    Patient summaries family history as documented by referring clinician  Counseling, testing information and materials prepared  \"I spent 30 minutes  from 1330 to 1400 reviewing past records, prior to visit pertaining to genetic risk.\"   Blair Sinclair M.D.  edited  "

## 2020-11-10 ENCOUNTER — OFFICE VISIT (OUTPATIENT)
Dept: HEMATOLOGY ONCOLOGY | Facility: MEDICAL CENTER | Age: 50
End: 2020-11-10
Payer: COMMERCIAL

## 2020-11-10 VITALS
HEIGHT: 64 IN | RESPIRATION RATE: 18 BRPM | BODY MASS INDEX: 25.41 KG/M2 | HEART RATE: 88 BPM | OXYGEN SATURATION: 98 % | WEIGHT: 148.81 LBS | DIASTOLIC BLOOD PRESSURE: 92 MMHG | SYSTOLIC BLOOD PRESSURE: 128 MMHG | TEMPERATURE: 99.2 F

## 2020-11-10 DIAGNOSIS — C50.919 MALIGNANT NEOPLASM OF FEMALE BREAST, UNSPECIFIED ESTROGEN RECEPTOR STATUS, UNSPECIFIED LATERALITY, UNSPECIFIED SITE OF BREAST (HCC): ICD-10-CM

## 2020-11-10 PROCEDURE — 99358 PROLONG SERVICE W/O CONTACT: CPT | Performed by: MEDICAL GENETICS

## 2020-11-10 PROCEDURE — 99211 OFF/OP EST MAY X REQ PHY/QHP: CPT | Performed by: MEDICAL GENETICS

## 2020-11-10 ASSESSMENT — FIBROSIS 4 INDEX: FIB4 SCORE: 1.05

## 2020-11-11 NOTE — PROGRESS NOTES
Patient originally referred for cancer genetic risk assessment, counseling and testing  The patient and I previously discussed familial implications and genetic discrimination  The patient agreed to testing and presents today for result review    Results    [facility] genetics  CancerNext 36 gene panel    NO PATHOGENIC VARIANTS IDENTIFIED    All questions answered

## 2021-05-25 ENCOUNTER — OFFICE VISIT (OUTPATIENT)
Dept: MEDICAL GROUP | Facility: PHYSICIAN GROUP | Age: 51
End: 2021-05-25
Payer: COMMERCIAL

## 2021-05-25 VITALS
TEMPERATURE: 98.4 F | DIASTOLIC BLOOD PRESSURE: 60 MMHG | WEIGHT: 147 LBS | BODY MASS INDEX: 25.23 KG/M2 | RESPIRATION RATE: 15 BRPM | HEART RATE: 68 BPM | OXYGEN SATURATION: 98 % | SYSTOLIC BLOOD PRESSURE: 119 MMHG

## 2021-05-25 DIAGNOSIS — E78.00 PURE HYPERCHOLESTEROLEMIA: ICD-10-CM

## 2021-05-25 DIAGNOSIS — F32.0 CURRENT MILD EPISODE OF MAJOR DEPRESSIVE DISORDER WITHOUT PRIOR EPISODE (HCC): ICD-10-CM

## 2021-05-25 DIAGNOSIS — K21.00 GASTROESOPHAGEAL REFLUX DISEASE WITH ESOPHAGITIS WITHOUT HEMORRHAGE: ICD-10-CM

## 2021-05-25 PROBLEM — K59.00 CONSTIPATION: Status: RESOLVED | Noted: 2018-11-20 | Resolved: 2021-05-25

## 2021-05-25 PROBLEM — F32.A DEPRESSION: Status: ACTIVE | Noted: 2021-05-25

## 2021-05-25 PROCEDURE — 99214 OFFICE O/P EST MOD 30 MIN: CPT | Performed by: FAMILY MEDICINE

## 2021-05-25 ASSESSMENT — PATIENT HEALTH QUESTIONNAIRE - PHQ9: CLINICAL INTERPRETATION OF PHQ2 SCORE: 0

## 2021-05-25 ASSESSMENT — FIBROSIS 4 INDEX: FIB4 SCORE: 1.07

## 2021-05-25 NOTE — ASSESSMENT & PLAN NOTE
Chronic issue  On lifestyle change  Last lipid panel 6/20  The 10-year ASCVD risk score (Carlitos LAMAR Jr., et al., 2013) is: 1.5%  Due repeat labs

## 2021-05-25 NOTE — ASSESSMENT & PLAN NOTE
Chronic issue  Has had some GERD with occasional flares  She occasionally gets chest discomfort unrelated to activity. She has stopped using coenzyme Q supplements which has relieved the chest discomfort. He has had this for quite some time and actually was evaluated for this in 2018 with negative work-up for ACS. She was told that she had GERD.

## 2021-05-25 NOTE — PROGRESS NOTES
Subjective:     Chief Complaint   Patient presents with   • Establish Care       HPI:   Jamilah presents today to discuss the following.      Pure hypercholesterolemia  Chronic issue  On lifestyle change  Last lipid panel 6/20  The 10-year ASCVD risk score (Jenkins BRIANDA Jr., et al., 2013) is: 1.5%  Due repeat labs    Depression  Chronic issue  Managed by Gyn  Likely related to menopause  On zoloft and works well for her    Gastroesophageal reflux disease  Chronic issue  Has had some GERD with occasional flares      Past Medical History:   Diagnosis Date   • Carpal tunnel syndrome    • Depression 5/25/2021   • GERD (gastroesophageal reflux disease)     at times with greasy food, tums or zantac,   • Pure hypercholesterolemia 8/23/2016       Current Outpatient Medications Ordered in Epic   Medication Sig Dispense Refill   • fluticasone (FLONASE) 50 MCG/ACT nasal spray Spray 2 Sprays in nose every day. 1 Bottle 11   • sertraline (ZOLOFT) 25 MG tablet Take 25 mg by mouth every day.     • loratadine (CLARITIN) 10 MG Tab Take 1 Tab by mouth every day. 30 Tab 2     No current Epic-ordered facility-administered medications on file.       Allergies:  Patient has no known allergies.    Health Maintenance: Completed    ROS:  Gen: no fevers/chills, no changes in weight  Eyes: no changes in vision  Pulm: no sob, no cough  CV: no chest pain, no palpitations  GI: no nausea/vomiting, no diarrhea      Objective:     Exam:  /60 (BP Location: Left arm, Patient Position: Sitting, BP Cuff Size: Adult)   Pulse 68   Temp 36.9 °C (98.4 °F) (Temporal)   Resp 15   Wt 66.7 kg (147 lb)   LMP 03/01/2015   SpO2 98%   BMI 25.23 kg/m²  Body mass index is 25.23 kg/m².      Constitutional: Alert, no distress, well-groomed.  Skin: Warm, dry, good turgor, no rashes in visible areas.  Eye: Equal, round and reactive, conjunctiva clear, lids normal.  ENMT: Lips without lesions, good dentition, moist mucous membranes.  Neck: Trachea midline, no  masses, no thyromegaly.  Respiratory: Unlabored respiratory effort, no cough.  MSK: Normal gait, moves all extremities.  Neuro: Grossly non-focal.   Psych: Alert and oriented x3, normal affect and mood.        Assessment & Plan:     51 y.o. female with the following -     1. Pure hypercholesterolemia  Chronic, stable condition. Due for repeat labs at this time.  - CBC WITHOUT DIFFERENTIAL; Future  - Basic Metabolic Panel; Future  - HEMOGLOBIN A1C; Future  - Lipid Profile; Future    2. Current mild episode of major depressive disorder without prior episode (HCC)  Chronic, stable condition. On Zoloft 25 mg daily. Continue with Zoloft.  - CBC WITHOUT DIFFERENTIAL; Future  - Basic Metabolic Panel; Future  - HEMOGLOBIN A1C; Future  - Lipid Profile; Future    3. Gastroesophageal reflux disease with esophagitis without hemorrhage  Chronic, stable condition. Occasionally gets flareups. She complains of chest discomfort on and off. Previously evaluated in the emergency room in 2018 with negative ACS work-up. Etiology is likely GERD. Strict emergency room precautions given today.  - CBC WITHOUT DIFFERENTIAL; Future  - Basic Metabolic Panel; Future  - HEMOGLOBIN A1C; Future  - Lipid Profile; Future      Return in about 3 months (around 8/25/2021).    Please note that this dictation was created using voice recognition software. I have made every reasonable attempt to correct obvious errors, but I expect that there are errors of grammar and possibly content that I did not discover before finalizing the note.

## 2021-07-03 ENCOUNTER — HOSPITAL ENCOUNTER (OUTPATIENT)
Dept: LAB | Facility: MEDICAL CENTER | Age: 51
End: 2021-07-03
Attending: FAMILY MEDICINE
Payer: COMMERCIAL

## 2021-07-03 DIAGNOSIS — K21.00 GASTROESOPHAGEAL REFLUX DISEASE WITH ESOPHAGITIS WITHOUT HEMORRHAGE: ICD-10-CM

## 2021-07-03 DIAGNOSIS — E78.00 PURE HYPERCHOLESTEROLEMIA: ICD-10-CM

## 2021-07-03 DIAGNOSIS — F32.0 CURRENT MILD EPISODE OF MAJOR DEPRESSIVE DISORDER WITHOUT PRIOR EPISODE (HCC): ICD-10-CM

## 2021-07-03 LAB
ANION GAP SERPL CALC-SCNC: 7 MMOL/L (ref 7–16)
BUN SERPL-MCNC: 13 MG/DL (ref 8–22)
CALCIUM SERPL-MCNC: 9.5 MG/DL (ref 8.5–10.5)
CHLORIDE SERPL-SCNC: 105 MMOL/L (ref 96–112)
CHOLEST SERPL-MCNC: 245 MG/DL (ref 100–199)
CO2 SERPL-SCNC: 27 MMOL/L (ref 20–33)
CREAT SERPL-MCNC: 0.65 MG/DL (ref 0.5–1.4)
ERYTHROCYTE [DISTWIDTH] IN BLOOD BY AUTOMATED COUNT: 42 FL (ref 35.9–50)
EST. AVERAGE GLUCOSE BLD GHB EST-MCNC: 114 MG/DL
FASTING STATUS PATIENT QL REPORTED: NORMAL
GLUCOSE SERPL-MCNC: 83 MG/DL (ref 65–99)
HBA1C MFR BLD: 5.6 % (ref 4–5.6)
HCT VFR BLD AUTO: 42 % (ref 37–47)
HDLC SERPL-MCNC: 65 MG/DL
HGB BLD-MCNC: 13.5 G/DL (ref 12–16)
LDLC SERPL CALC-MCNC: 164 MG/DL
MCH RBC QN AUTO: 29.1 PG (ref 27–33)
MCHC RBC AUTO-ENTMCNC: 32.1 G/DL (ref 33.6–35)
MCV RBC AUTO: 90.5 FL (ref 81.4–97.8)
PLATELET # BLD AUTO: 199 K/UL (ref 164–446)
PMV BLD AUTO: 11.4 FL (ref 9–12.9)
POTASSIUM SERPL-SCNC: 4 MMOL/L (ref 3.6–5.5)
RBC # BLD AUTO: 4.64 M/UL (ref 4.2–5.4)
SODIUM SERPL-SCNC: 139 MMOL/L (ref 135–145)
TRIGL SERPL-MCNC: 82 MG/DL (ref 0–149)
WBC # BLD AUTO: 2.8 K/UL (ref 4.8–10.8)

## 2021-07-03 PROCEDURE — 83036 HEMOGLOBIN GLYCOSYLATED A1C: CPT

## 2021-07-03 PROCEDURE — 80061 LIPID PANEL: CPT

## 2021-07-03 PROCEDURE — 80048 BASIC METABOLIC PNL TOTAL CA: CPT

## 2021-07-03 PROCEDURE — 36415 COLL VENOUS BLD VENIPUNCTURE: CPT

## 2021-07-03 PROCEDURE — 85027 COMPLETE CBC AUTOMATED: CPT

## 2021-09-01 ENCOUNTER — HOSPITAL ENCOUNTER (OUTPATIENT)
Facility: MEDICAL CENTER | Age: 51
End: 2021-09-01
Attending: OBSTETRICS & GYNECOLOGY | Admitting: OBSTETRICS & GYNECOLOGY
Payer: COMMERCIAL

## 2021-09-03 ENCOUNTER — OFFICE VISIT (OUTPATIENT)
Dept: MEDICAL GROUP | Facility: PHYSICIAN GROUP | Age: 51
End: 2021-09-03
Payer: COMMERCIAL

## 2021-09-03 VITALS
HEART RATE: 90 BPM | TEMPERATURE: 98.7 F | BODY MASS INDEX: 24.41 KG/M2 | WEIGHT: 143 LBS | HEIGHT: 64 IN | OXYGEN SATURATION: 95 % | DIASTOLIC BLOOD PRESSURE: 70 MMHG | SYSTOLIC BLOOD PRESSURE: 120 MMHG

## 2021-09-03 DIAGNOSIS — D72.819 LEUKOPENIA, UNSPECIFIED TYPE: ICD-10-CM

## 2021-09-03 DIAGNOSIS — E78.00 PURE HYPERCHOLESTEROLEMIA: ICD-10-CM

## 2021-09-03 DIAGNOSIS — N81.10 BLADDER PROLAPSE, FEMALE, ACQUIRED: ICD-10-CM

## 2021-09-03 PROCEDURE — 99214 OFFICE O/P EST MOD 30 MIN: CPT | Performed by: FAMILY MEDICINE

## 2021-09-03 ASSESSMENT — FIBROSIS 4 INDEX: FIB4 SCORE: 1.1

## 2021-09-03 NOTE — ASSESSMENT & PLAN NOTE
Chronic issue  Lipid panel 7/21 with elevated values  The 10-year ASCVD risk score (Carlitos LAMAR Jr., et al., 2013) is: 1.5%

## 2021-09-03 NOTE — PROGRESS NOTES
"Subjective:     Chief Complaint   Patient presents with   • Lab Results   • Other     Surgery coming up 9/8/2021       HPI:   Jamilah presents today to discuss the following.    Leukopenia  Chronic issue  Has recurrent hx of this  Most recent WBC at 2.8 7/21  Denies recurrent infection    Pure hypercholesterolemia  Chronic issue  Lipid panel 7/21 with elevated values  The 10-year ASCVD risk score (Carlitosdarin LAMAR Jr., et al., 2013) is: 1.5%      Bladder prolapse, female, acquired  Chronic issue  Has stress incontinence due to this  Scheduled for surgery 9/8/21      Past Medical History:   Diagnosis Date   • Carpal tunnel syndrome    • Depression 5/25/2021   • GERD (gastroesophageal reflux disease)     at times with greasy food, tums or zantac,   • Pure hypercholesterolemia 8/23/2016       Current Outpatient Medications Ordered in Epic   Medication Sig Dispense Refill   • Lubiprostone (AMITIZA PO) Take  by mouth.     • Sennosides (SENOKOT PO) Take  by mouth.     • fluticasone (FLONASE) 50 MCG/ACT nasal spray Spray 2 Sprays in nose every day. 1 Bottle 11   • sertraline (ZOLOFT) 25 MG tablet Take 25 mg by mouth every day.     • loratadine (CLARITIN) 10 MG Tab Take 1 Tab by mouth every day. 30 Tab 2     No current Epic-ordered facility-administered medications on file.       Allergies:  Patient has no known allergies.    Health Maintenance: Completed    ROS:  Gen: no fevers/chills, no changes in weight  Eyes: no changes in vision  Pulm: no sob, no cough  CV: no chest pain, no palpitations  GI: no nausea/vomiting, no diarrhea      Objective:     Exam:  /70 (BP Location: Right arm, Patient Position: Sitting, BP Cuff Size: Adult)   Pulse 90   Temp 37.1 °C (98.7 °F) (Temporal)   Ht 1.626 m (5' 4\")   Wt 64.9 kg (143 lb)   LMP 03/01/2015   SpO2 95%   BMI 24.55 kg/m²  Body mass index is 24.55 kg/m².        Constitutional: Alert, no distress, well-groomed.  Skin: Warm, dry, good turgor, no rashes in visible areas.  Eye: " Equal, round and reactive, conjunctiva clear, lids normal.  ENMT: Lips without lesions, good dentition, moist mucous membranes.  Neck: Trachea midline, no masses, no thyromegaly.  Respiratory: Unlabored respiratory effort, no cough.  MSK: Normal gait, moves all extremities.  Neuro: Grossly non-focal.   Psych: Alert and oriented x3, normal affect and mood.        Assessment & Plan:     51 y.o. female with the following -     1. Leukopenia, unspecified type  Chronic issue.  Most recent CBC July 2021 demonstrated you white blood cell count of 2.8.  It was previously 3.11-year ago.  Would like to further investigate this with differential and HIV testing.  - CBC WITH DIFFERENTIAL; Future  - HIV AG/AB COMBO ASSAY SCREENING; Future    2. Pure hypercholesterolemia  Chronic, worsening condition.  Mildly elevated cholesterol values.  Not a statin candidate.  Continue with lifestyle change.    3. Bladder prolapse, female, acquired  Chronic, unchanged condition.  Has bladder prolapse with resultant urinary incontinence.  Scheduled for medical procedure next week.  -Await gynecology's further recommendations      No follow-ups on file.    Please note that this dictation was created using voice recognition software. I have made every reasonable attempt to correct obvious errors, but I expect that there are errors of grammar and possibly content that I did not discover before finalizing the note.

## 2021-09-07 ENCOUNTER — APPOINTMENT (OUTPATIENT)
Dept: ADMISSIONS | Facility: MEDICAL CENTER | Age: 51
End: 2021-09-07
Payer: COMMERCIAL

## 2021-09-07 ENCOUNTER — APPOINTMENT (OUTPATIENT)
Dept: ADMISSIONS | Facility: MEDICAL CENTER | Age: 51
End: 2021-09-07
Attending: OBSTETRICS & GYNECOLOGY
Payer: COMMERCIAL

## 2021-09-14 ENCOUNTER — HOSPITAL ENCOUNTER (OUTPATIENT)
Dept: LAB | Facility: MEDICAL CENTER | Age: 51
End: 2021-09-14
Attending: FAMILY MEDICINE
Payer: COMMERCIAL

## 2021-09-14 DIAGNOSIS — D72.819 LEUKOPENIA, UNSPECIFIED TYPE: ICD-10-CM

## 2021-09-14 LAB
BASOPHILS # BLD AUTO: 0.9 % (ref 0–1.8)
BASOPHILS # BLD: 0.04 K/UL (ref 0–0.12)
EOSINOPHIL # BLD AUTO: 0.07 K/UL (ref 0–0.51)
EOSINOPHIL NFR BLD: 1.6 % (ref 0–6.9)
ERYTHROCYTE [DISTWIDTH] IN BLOOD BY AUTOMATED COUNT: 40.7 FL (ref 35.9–50)
HCT VFR BLD AUTO: 38.2 % (ref 37–47)
HGB BLD-MCNC: 12.6 G/DL (ref 12–16)
HIV 1+2 AB+HIV1 P24 AG SERPL QL IA: NORMAL
IMM GRANULOCYTES # BLD AUTO: 0.01 K/UL (ref 0–0.11)
IMM GRANULOCYTES NFR BLD AUTO: 0.2 % (ref 0–0.9)
LYMPHOCYTES # BLD AUTO: 1.33 K/UL (ref 1–4.8)
LYMPHOCYTES NFR BLD: 29.6 % (ref 22–41)
MCH RBC QN AUTO: 29.2 PG (ref 27–33)
MCHC RBC AUTO-ENTMCNC: 33 G/DL (ref 33.6–35)
MCV RBC AUTO: 88.6 FL (ref 81.4–97.8)
MONOCYTES # BLD AUTO: 0.31 K/UL (ref 0–0.85)
MONOCYTES NFR BLD AUTO: 6.9 % (ref 0–13.4)
NEUTROPHILS # BLD AUTO: 2.74 K/UL (ref 2–7.15)
NEUTROPHILS NFR BLD: 60.8 % (ref 44–72)
NRBC # BLD AUTO: 0 K/UL
NRBC BLD-RTO: 0 /100 WBC
PLATELET # BLD AUTO: 209 K/UL (ref 164–446)
PMV BLD AUTO: 12 FL (ref 9–12.9)
RBC # BLD AUTO: 4.31 M/UL (ref 4.2–5.4)
WBC # BLD AUTO: 4.5 K/UL (ref 4.8–10.8)

## 2021-09-14 PROCEDURE — 85025 COMPLETE CBC W/AUTO DIFF WBC: CPT

## 2021-09-14 PROCEDURE — 87389 HIV-1 AG W/HIV-1&-2 AB AG IA: CPT

## 2021-09-14 PROCEDURE — 36415 COLL VENOUS BLD VENIPUNCTURE: CPT

## 2021-11-05 ENCOUNTER — HOSPITAL ENCOUNTER (OUTPATIENT)
Dept: RADIOLOGY | Facility: MEDICAL CENTER | Age: 51
End: 2021-11-05
Attending: FAMILY MEDICINE
Payer: COMMERCIAL

## 2021-11-05 DIAGNOSIS — Z12.31 VISIT FOR SCREENING MAMMOGRAM: ICD-10-CM

## 2021-11-05 PROCEDURE — 77063 BREAST TOMOSYNTHESIS BI: CPT

## 2021-11-23 ENCOUNTER — PRE-ADMISSION TESTING (OUTPATIENT)
Dept: ADMISSIONS | Facility: MEDICAL CENTER | Age: 51
End: 2021-11-23
Attending: OBSTETRICS & GYNECOLOGY
Payer: COMMERCIAL

## 2021-11-23 DIAGNOSIS — Z01.812 PRE-PROCEDURAL LABORATORY EXAMINATION: ICD-10-CM

## 2021-11-23 LAB
ABO GROUP BLD: NORMAL
BASOPHILS # BLD AUTO: 1.2 % (ref 0–1.8)
BASOPHILS # BLD: 0.04 K/UL (ref 0–0.12)
BLD GP AB SCN SERPL QL: NORMAL
EOSINOPHIL # BLD AUTO: 0.04 K/UL (ref 0–0.51)
EOSINOPHIL NFR BLD: 1.2 % (ref 0–6.9)
ERYTHROCYTE [DISTWIDTH] IN BLOOD BY AUTOMATED COUNT: 43.3 FL (ref 35.9–50)
HCT VFR BLD AUTO: 40.8 % (ref 37–47)
HGB BLD-MCNC: 13.3 G/DL (ref 12–16)
IMM GRANULOCYTES # BLD AUTO: 0.01 K/UL (ref 0–0.11)
IMM GRANULOCYTES NFR BLD AUTO: 0.3 % (ref 0–0.9)
LYMPHOCYTES # BLD AUTO: 1.18 K/UL (ref 1–4.8)
LYMPHOCYTES NFR BLD: 36.3 % (ref 22–41)
MCH RBC QN AUTO: 29 PG (ref 27–33)
MCHC RBC AUTO-ENTMCNC: 32.6 G/DL (ref 33.6–35)
MCV RBC AUTO: 89.1 FL (ref 81.4–97.8)
MONOCYTES # BLD AUTO: 0.21 K/UL (ref 0–0.85)
MONOCYTES NFR BLD AUTO: 6.5 % (ref 0–13.4)
NEUTROPHILS # BLD AUTO: 1.77 K/UL (ref 2–7.15)
NEUTROPHILS NFR BLD: 54.5 % (ref 44–72)
NRBC # BLD AUTO: 0 K/UL
NRBC BLD-RTO: 0 /100 WBC
PLATELET # BLD AUTO: 163 K/UL (ref 164–446)
PMV BLD AUTO: 11.5 FL (ref 9–12.9)
RBC # BLD AUTO: 4.58 M/UL (ref 4.2–5.4)
RH BLD: NORMAL
WBC # BLD AUTO: 3.3 K/UL (ref 4.8–10.8)

## 2021-11-23 PROCEDURE — 85025 COMPLETE CBC W/AUTO DIFF WBC: CPT

## 2021-11-23 PROCEDURE — 86901 BLOOD TYPING SEROLOGIC RH(D): CPT

## 2021-11-23 PROCEDURE — 80053 COMPREHEN METABOLIC PANEL: CPT

## 2021-11-23 PROCEDURE — 36415 COLL VENOUS BLD VENIPUNCTURE: CPT

## 2021-11-23 PROCEDURE — 86850 RBC ANTIBODY SCREEN: CPT

## 2021-11-23 PROCEDURE — 86900 BLOOD TYPING SEROLOGIC ABO: CPT

## 2021-11-23 ASSESSMENT — FIBROSIS 4 INDEX: FIB4 SCORE: 1.05

## 2021-11-24 LAB
ALBUMIN SERPL BCP-MCNC: 4.6 G/DL (ref 3.2–4.9)
ALBUMIN/GLOB SERPL: 1.7 G/DL
ALP SERPL-CCNC: 64 U/L (ref 30–99)
ALT SERPL-CCNC: 21 U/L (ref 2–50)
ANION GAP SERPL CALC-SCNC: 12 MMOL/L (ref 7–16)
AST SERPL-CCNC: 18 U/L (ref 12–45)
BILIRUB SERPL-MCNC: 0.3 MG/DL (ref 0.1–1.5)
BUN SERPL-MCNC: 12 MG/DL (ref 8–22)
CALCIUM SERPL-MCNC: 10.2 MG/DL (ref 8.5–10.5)
CHLORIDE SERPL-SCNC: 100 MMOL/L (ref 96–112)
CO2 SERPL-SCNC: 28 MMOL/L (ref 20–33)
CREAT SERPL-MCNC: 0.69 MG/DL (ref 0.5–1.4)
GLOBULIN SER CALC-MCNC: 2.7 G/DL (ref 1.9–3.5)
GLUCOSE SERPL-MCNC: 72 MG/DL (ref 65–99)
POTASSIUM SERPL-SCNC: 4.2 MMOL/L (ref 3.6–5.5)
PROT SERPL-MCNC: 7.3 G/DL (ref 6–8.2)
SODIUM SERPL-SCNC: 140 MMOL/L (ref 135–145)

## 2021-11-28 NOTE — H&P
DATE OF ADMISSION:  2021     CHIEF COMPLAINT:  Worsening stress incontinence and a bulge in the vagina.     HISTORY OF PRESENT ILLNESS:  This patient is a 51-year-old  1, para 1   Latin-American female who previously had a hysterectomy in .  The patient   now with worsening stress incontinence.  She likes to run and she has to wear   a pad because when she runs, she leaks urine.  The patient was also   complaining of urgency to urinate every 2-3 hours and nocturia.  The patient   recently had a negative screen for diabetes.  She also had a negative urine,   which did not show any evidence of a UTI.  The patient does not have UTI   symptoms, no pain with urination.  The patient was started on Myrbetriq 50 mg   to take at bedtime.  The patient states that the Myrbetriq helped with urge   incontinence and nocturia, but it did not help with the stress incontinence.    The patient was therefore brought to the office and had simple urodynamics in   the office.  The patient voided 110 mL.  PVR was done and she had 5 mL.  Her   first sensation was at 230 mL, normal urge at 375 mL, total capacity was at   470 mL.  It did show uninhibited contractions and also stress incontinence.    At this time, the patient has been using estrogen vaginal cream.  She stopped   the Myrbetriq since the urge incontinence resolved, but because she continues   having the stress incontinence and the bulge in the vagina, she is scheduled   for an exam under anesthesia, anterior colporrhaphy and a retropubic sling.  I   have discussed with the patient the risks, benefits, indications and   alternatives to surgery.  She has no unanswered questions and wants to   proceed.     PAST MEDICAL HISTORY:  Atrophic vaginitis.     PAST SURGICAL HISTORY:  Previous hysterectomy in .     MEDICATIONS:  1.  She takes Claritin as needed.  2.  She is on Senokot.  3.  Amitiza.  4.  Estrace vaginal cream half a gram 3 times a week.  5.  Zoloft 25  mg 1 p.o. at bedtime for hot flashes.     ALLERGIES:  No known drug allergies.     OBSTETRICAL HISTORY:  She is a  1, para 1.  Previously, the patient had   a normal spontaneous vaginal delivery in .     GYNECOLOGIC HISTORY:  The patient is status post hysterectomy in .  Her   last mammogram was on 10/01/2020 and it was negative.  No history of STDs.     SOCIAL HISTORY:  The patient is .  Denies tobacco, alcohol or drug use.     PHYSICAL EXAMINATION:  VITAL SIGNS:  Blood pressure 127/76, heart rate is 92, weight 149 pounds.  GENERAL:  Pleasant female in no acute distress.  LUNGS:  Clear to auscultation bilaterally.  CARDIOVASCULAR:  Regular rate and rhythm.  No murmur.  ABDOMEN:  Soft, nontender, nondistended.  EXTREMITIES:  No calf tenderness.  GENITOURINARY:  Normal external female genitalia.  Vagina without any lesions   or discharge, about a grade III cystocele.  Absent cervix, absent uterus.    Normal adnexal exam, no masses palpated.  EXTREMITIES:  No calf tenderness.     LABORATORY DATA:  Again, the urodynamics exam as above.  Most current labs, H   and H 13.3 and 40.8 and platelets are 163.  COVID test is pending.  CMP is   pending.     ASSESSMENT AND PLAN:  A 51-year-old  1, para 1 Latin-American female.  1.  Symptomatic cystocele and genuine stress urinary incontinence.  The   patient is status post negative urinalysis, no evidence of UTI.  She has been   ruled out for diabetes.  On exam, she has a grade III cystocele and she has   genuine stress urinary incontinence.  Therefore, we are proceeding with an   anterior colporrhaphy and a retropubic sling and cystoscopy and other   indicated procedures.  I have discussed with the patient the risks of surgery   including infection, bleeding, damage to adjacent organs including the bladder   or the ureters or the bowel.  She has no unanswered questions and wants to   proceed.  The patient is aware that if she cannot urinate after  surgery, she   may need to go home with a Hernandez catheter versus learning to self-cath until   she is able to urinate.  All questions have been answered and we will proceed   with surgery.  2.  Previous hysterectomy.  3.  Atrophic vaginitis.  The patient on Estrace vaginal cream.  4.  Urge incontinence, resolved with Myrbetriq and at this time, the patient   is no longer taking Myrbetriq.  The patient has a prescription in case the   urge incontinence and the nocturia returns.        ______________________________  MD SAMEER DOLL/LISA    DD:  11/27/2021 16:27  DT:  11/27/2021 17:08    Job#:  [id number]

## 2021-12-02 ENCOUNTER — ANESTHESIA EVENT (OUTPATIENT)
Dept: SURGERY | Facility: MEDICAL CENTER | Age: 51
End: 2021-12-02
Payer: COMMERCIAL

## 2021-12-03 ENCOUNTER — ANESTHESIA (OUTPATIENT)
Dept: SURGERY | Facility: MEDICAL CENTER | Age: 51
End: 2021-12-03
Payer: COMMERCIAL

## 2021-12-03 ENCOUNTER — HOSPITAL ENCOUNTER (OUTPATIENT)
Facility: MEDICAL CENTER | Age: 51
End: 2021-12-03
Attending: OBSTETRICS & GYNECOLOGY | Admitting: OBSTETRICS & GYNECOLOGY
Payer: COMMERCIAL

## 2021-12-03 VITALS
HEART RATE: 72 BPM | OXYGEN SATURATION: 97 % | RESPIRATION RATE: 18 BRPM | HEIGHT: 64 IN | SYSTOLIC BLOOD PRESSURE: 105 MMHG | BODY MASS INDEX: 24.62 KG/M2 | WEIGHT: 144.18 LBS | TEMPERATURE: 99.5 F | DIASTOLIC BLOOD PRESSURE: 59 MMHG

## 2021-12-03 DIAGNOSIS — Z09 SURGERY FOLLOW-UP: ICD-10-CM

## 2021-12-03 LAB
EXTERNAL QUALITY CONTROL: NORMAL
SARS-COV+SARS-COV-2 AG RESP QL IA.RAPID: NEGATIVE

## 2021-12-03 PROCEDURE — C1771 REP DEV, URINARY, W/SLING: HCPCS | Performed by: OBSTETRICS & GYNECOLOGY

## 2021-12-03 PROCEDURE — 160046 HCHG PACU - 1ST 60 MINS PHASE II: Performed by: OBSTETRICS & GYNECOLOGY

## 2021-12-03 PROCEDURE — 160029 HCHG SURGERY MINUTES - 1ST 30 MINS LEVEL 4: Performed by: OBSTETRICS & GYNECOLOGY

## 2021-12-03 PROCEDURE — 700101 HCHG RX REV CODE 250: Performed by: ANESTHESIOLOGY

## 2021-12-03 PROCEDURE — 501330 HCHG SET, CYSTO IRRIG TUBING: Performed by: OBSTETRICS & GYNECOLOGY

## 2021-12-03 PROCEDURE — 700102 HCHG RX REV CODE 250 W/ 637 OVERRIDE(OP): Performed by: ANESTHESIOLOGY

## 2021-12-03 PROCEDURE — 500901 HCHG PACKING, VAG 2 X-RAY: Performed by: OBSTETRICS & GYNECOLOGY

## 2021-12-03 PROCEDURE — 700111 HCHG RX REV CODE 636 W/ 250 OVERRIDE (IP): Performed by: ANESTHESIOLOGY

## 2021-12-03 PROCEDURE — 160048 HCHG OR STATISTICAL LEVEL 1-5: Performed by: OBSTETRICS & GYNECOLOGY

## 2021-12-03 PROCEDURE — 160036 HCHG PACU - EA ADDL 30 MINS PHASE I: Performed by: OBSTETRICS & GYNECOLOGY

## 2021-12-03 PROCEDURE — 700105 HCHG RX REV CODE 258: Performed by: OBSTETRICS & GYNECOLOGY

## 2021-12-03 PROCEDURE — 160002 HCHG RECOVERY MINUTES (STAT): Performed by: OBSTETRICS & GYNECOLOGY

## 2021-12-03 PROCEDURE — 160041 HCHG SURGERY MINUTES - EA ADDL 1 MIN LEVEL 4: Performed by: OBSTETRICS & GYNECOLOGY

## 2021-12-03 PROCEDURE — 160025 RECOVERY II MINUTES (STATS): Performed by: OBSTETRICS & GYNECOLOGY

## 2021-12-03 PROCEDURE — 160035 HCHG PACU - 1ST 60 MINS PHASE I: Performed by: OBSTETRICS & GYNECOLOGY

## 2021-12-03 PROCEDURE — 160009 HCHG ANES TIME/MIN: Performed by: OBSTETRICS & GYNECOLOGY

## 2021-12-03 PROCEDURE — 700101 HCHG RX REV CODE 250: Performed by: OBSTETRICS & GYNECOLOGY

## 2021-12-03 PROCEDURE — 500892 HCHG PACK, PERI-GYN: Performed by: OBSTETRICS & GYNECOLOGY

## 2021-12-03 PROCEDURE — 160047 HCHG PACU  - EA ADDL 30 MINS PHASE II: Performed by: OBSTETRICS & GYNECOLOGY

## 2021-12-03 PROCEDURE — 501838 HCHG SUTURE GENERAL: Performed by: OBSTETRICS & GYNECOLOGY

## 2021-12-03 PROCEDURE — 87426 SARSCOV CORONAVIRUS AG IA: CPT | Performed by: OBSTETRICS & GYNECOLOGY

## 2021-12-03 PROCEDURE — 110454 HCHG SHELL REV 250: Performed by: OBSTETRICS & GYNECOLOGY

## 2021-12-03 PROCEDURE — A9270 NON-COVERED ITEM OR SERVICE: HCPCS | Performed by: ANESTHESIOLOGY

## 2021-12-03 DEVICE — SYSTEM SUPRAPUBIC MID-URETHRAL SLING LYNX: Type: IMPLANTABLE DEVICE | Status: FUNCTIONAL

## 2021-12-03 RX ORDER — HALOPERIDOL 5 MG/ML
1 INJECTION INTRAMUSCULAR
Status: DISCONTINUED | OUTPATIENT
Start: 2021-12-03 | End: 2021-12-03 | Stop reason: HOSPADM

## 2021-12-03 RX ORDER — FUROSEMIDE 10 MG/ML
INJECTION INTRAMUSCULAR; INTRAVENOUS PRN
Status: DISCONTINUED | OUTPATIENT
Start: 2021-12-03 | End: 2021-12-03 | Stop reason: SURG

## 2021-12-03 RX ORDER — OXYCODONE HCL 5 MG/5 ML
10 SOLUTION, ORAL ORAL
Status: COMPLETED | OUTPATIENT
Start: 2021-12-03 | End: 2021-12-03

## 2021-12-03 RX ORDER — MIDAZOLAM HYDROCHLORIDE 1 MG/ML
INJECTION INTRAMUSCULAR; INTRAVENOUS PRN
Status: DISCONTINUED | OUTPATIENT
Start: 2021-12-03 | End: 2021-12-03 | Stop reason: SURG

## 2021-12-03 RX ORDER — ACETAMINOPHEN 500 MG
1000 TABLET ORAL ONCE
Status: COMPLETED | OUTPATIENT
Start: 2021-12-03 | End: 2021-12-03

## 2021-12-03 RX ORDER — EPINEPHRINE 1 MG/ML(1)
AMPUL (ML) INJECTION
Status: DISCONTINUED
Start: 2021-12-03 | End: 2021-12-03 | Stop reason: HOSPADM

## 2021-12-03 RX ORDER — HYDRALAZINE HYDROCHLORIDE 20 MG/ML
5 INJECTION INTRAMUSCULAR; INTRAVENOUS
Status: DISCONTINUED | OUTPATIENT
Start: 2021-12-03 | End: 2021-12-03 | Stop reason: HOSPADM

## 2021-12-03 RX ORDER — OXYCODONE HCL 5 MG/5 ML
5 SOLUTION, ORAL ORAL
Status: COMPLETED | OUTPATIENT
Start: 2021-12-03 | End: 2021-12-03

## 2021-12-03 RX ORDER — ONDANSETRON 2 MG/ML
4 INJECTION INTRAMUSCULAR; INTRAVENOUS
Status: DISCONTINUED | OUTPATIENT
Start: 2021-12-03 | End: 2021-12-03 | Stop reason: HOSPADM

## 2021-12-03 RX ORDER — HYDROMORPHONE HYDROCHLORIDE 1 MG/ML
0.2 INJECTION, SOLUTION INTRAMUSCULAR; INTRAVENOUS; SUBCUTANEOUS
Status: DISCONTINUED | OUTPATIENT
Start: 2021-12-03 | End: 2021-12-03 | Stop reason: HOSPADM

## 2021-12-03 RX ORDER — SODIUM CHLORIDE, SODIUM LACTATE, POTASSIUM CHLORIDE, CALCIUM CHLORIDE 600; 310; 30; 20 MG/100ML; MG/100ML; MG/100ML; MG/100ML
INJECTION, SOLUTION INTRAVENOUS CONTINUOUS
Status: DISCONTINUED | OUTPATIENT
Start: 2021-12-03 | End: 2021-12-03 | Stop reason: HOSPADM

## 2021-12-03 RX ORDER — HYDROMORPHONE HYDROCHLORIDE 1 MG/ML
0.1 INJECTION, SOLUTION INTRAMUSCULAR; INTRAVENOUS; SUBCUTANEOUS
Status: DISCONTINUED | OUTPATIENT
Start: 2021-12-03 | End: 2021-12-03 | Stop reason: HOSPADM

## 2021-12-03 RX ORDER — BUPIVACAINE HYDROCHLORIDE AND EPINEPHRINE 2.5; 5 MG/ML; UG/ML
INJECTION, SOLUTION EPIDURAL; INFILTRATION; INTRACAUDAL; PERINEURAL
Status: DISCONTINUED | OUTPATIENT
Start: 2021-12-03 | End: 2021-12-03 | Stop reason: HOSPADM

## 2021-12-03 RX ORDER — LABETALOL HYDROCHLORIDE 5 MG/ML
5 INJECTION, SOLUTION INTRAVENOUS
Status: DISCONTINUED | OUTPATIENT
Start: 2021-12-03 | End: 2021-12-03 | Stop reason: HOSPADM

## 2021-12-03 RX ORDER — ONDANSETRON 2 MG/ML
INJECTION INTRAMUSCULAR; INTRAVENOUS PRN
Status: DISCONTINUED | OUTPATIENT
Start: 2021-12-03 | End: 2021-12-03 | Stop reason: SURG

## 2021-12-03 RX ORDER — DEXMEDETOMIDINE HYDROCHLORIDE 100 UG/ML
INJECTION, SOLUTION INTRAVENOUS PRN
Status: DISCONTINUED | OUTPATIENT
Start: 2021-12-03 | End: 2021-12-03 | Stop reason: SURG

## 2021-12-03 RX ORDER — FUROSEMIDE 10 MG/ML
INJECTION INTRAMUSCULAR; INTRAVENOUS
Status: COMPLETED
Start: 2021-12-03 | End: 2021-12-03

## 2021-12-03 RX ORDER — SIMETHICONE 125 MG
125 TABLET,CHEWABLE ORAL 3 TIMES DAILY PRN
Status: DISCONTINUED | OUTPATIENT
Start: 2021-12-03 | End: 2021-12-03 | Stop reason: HOSPADM

## 2021-12-03 RX ORDER — MEPERIDINE HYDROCHLORIDE 25 MG/ML
12.5 INJECTION INTRAMUSCULAR; INTRAVENOUS; SUBCUTANEOUS
Status: DISCONTINUED | OUTPATIENT
Start: 2021-12-03 | End: 2021-12-03 | Stop reason: HOSPADM

## 2021-12-03 RX ORDER — HYDROMORPHONE HYDROCHLORIDE 1 MG/ML
0.4 INJECTION, SOLUTION INTRAMUSCULAR; INTRAVENOUS; SUBCUTANEOUS
Status: DISCONTINUED | OUTPATIENT
Start: 2021-12-03 | End: 2021-12-03 | Stop reason: HOSPADM

## 2021-12-03 RX ORDER — DEXAMETHASONE SODIUM PHOSPHATE 4 MG/ML
INJECTION, SOLUTION INTRA-ARTICULAR; INTRALESIONAL; INTRAMUSCULAR; INTRAVENOUS; SOFT TISSUE PRN
Status: DISCONTINUED | OUTPATIENT
Start: 2021-12-03 | End: 2021-12-03 | Stop reason: SURG

## 2021-12-03 RX ORDER — SODIUM CHLORIDE, SODIUM LACTATE, POTASSIUM CHLORIDE, CALCIUM CHLORIDE 600; 310; 30; 20 MG/100ML; MG/100ML; MG/100ML; MG/100ML
INJECTION, SOLUTION INTRAVENOUS CONTINUOUS
Status: ACTIVE | OUTPATIENT
Start: 2021-12-03 | End: 2021-12-03

## 2021-12-03 RX ORDER — DOCUSATE SODIUM 100 MG/1
100 CAPSULE, LIQUID FILLED ORAL 2 TIMES DAILY
Qty: 60 CAPSULE | Refills: 1 | Status: SHIPPED
Start: 2021-12-03 | End: 2022-02-17

## 2021-12-03 RX ORDER — ROCURONIUM BROMIDE 10 MG/ML
INJECTION, SOLUTION INTRAVENOUS PRN
Status: DISCONTINUED | OUTPATIENT
Start: 2021-12-03 | End: 2021-12-03 | Stop reason: SURG

## 2021-12-03 RX ORDER — CEFAZOLIN SODIUM 1 G/3ML
INJECTION, POWDER, FOR SOLUTION INTRAMUSCULAR; INTRAVENOUS PRN
Status: DISCONTINUED | OUTPATIENT
Start: 2021-12-03 | End: 2021-12-03 | Stop reason: SURG

## 2021-12-03 RX ORDER — PROMETHAZINE HYDROCHLORIDE 25 MG/1
12.5 SUPPOSITORY RECTAL EVERY 4 HOURS PRN
Status: DISCONTINUED | OUTPATIENT
Start: 2021-12-03 | End: 2021-12-03 | Stop reason: HOSPADM

## 2021-12-03 RX ORDER — CELECOXIB 200 MG/1
200 CAPSULE ORAL ONCE
Status: COMPLETED | OUTPATIENT
Start: 2021-12-03 | End: 2021-12-03

## 2021-12-03 RX ORDER — IPRATROPIUM BROMIDE AND ALBUTEROL SULFATE 2.5; .5 MG/3ML; MG/3ML
3 SOLUTION RESPIRATORY (INHALATION)
Status: DISCONTINUED | OUTPATIENT
Start: 2021-12-03 | End: 2021-12-03 | Stop reason: HOSPADM

## 2021-12-03 RX ORDER — BUPIVACAINE HYDROCHLORIDE 2.5 MG/ML
INJECTION, SOLUTION EPIDURAL; INFILTRATION; INTRACAUDAL
Status: DISCONTINUED
Start: 2021-12-03 | End: 2021-12-03 | Stop reason: HOSPADM

## 2021-12-03 RX ORDER — OXYCODONE HYDROCHLORIDE AND ACETAMINOPHEN 5; 325 MG/1; MG/1
1 TABLET ORAL EVERY 4 HOURS PRN
Qty: 15 TABLET | Refills: 0 | Status: SHIPPED | OUTPATIENT
Start: 2021-12-03 | End: 2021-12-10

## 2021-12-03 RX ORDER — IBUPROFEN 600 MG/1
600 TABLET ORAL EVERY 6 HOURS PRN
Qty: 60 TABLET | Refills: 1 | Status: SHIPPED
Start: 2021-12-03 | End: 2022-06-23

## 2021-12-03 RX ORDER — LIDOCAINE HYDROCHLORIDE 20 MG/ML
INJECTION, SOLUTION EPIDURAL; INFILTRATION; INTRACAUDAL; PERINEURAL PRN
Status: DISCONTINUED | OUTPATIENT
Start: 2021-12-03 | End: 2021-12-03 | Stop reason: SURG

## 2021-12-03 RX ORDER — DIPHENHYDRAMINE HYDROCHLORIDE 50 MG/ML
12.5 INJECTION INTRAMUSCULAR; INTRAVENOUS
Status: DISCONTINUED | OUTPATIENT
Start: 2021-12-03 | End: 2021-12-03 | Stop reason: HOSPADM

## 2021-12-03 RX ADMIN — ROCURONIUM BROMIDE 50 MG: 10 INJECTION, SOLUTION INTRAVENOUS at 12:50

## 2021-12-03 RX ADMIN — ONDANSETRON 4 MG: 2 INJECTION INTRAMUSCULAR; INTRAVENOUS at 13:51

## 2021-12-03 RX ADMIN — DEXAMETHASONE SODIUM PHOSPHATE 8 MG: 4 INJECTION, SOLUTION INTRA-ARTICULAR; INTRALESIONAL; INTRAMUSCULAR; INTRAVENOUS; SOFT TISSUE at 12:58

## 2021-12-03 RX ADMIN — FENTANYL CITRATE 50 MCG: 50 INJECTION, SOLUTION INTRAMUSCULAR; INTRAVENOUS at 12:48

## 2021-12-03 RX ADMIN — FENTANYL CITRATE 50 MCG: 50 INJECTION, SOLUTION INTRAMUSCULAR; INTRAVENOUS at 13:37

## 2021-12-03 RX ADMIN — CEFAZOLIN 2 G: 330 INJECTION, POWDER, FOR SOLUTION INTRAMUSCULAR; INTRAVENOUS at 12:50

## 2021-12-03 RX ADMIN — SUGAMMADEX 200 MG: 100 INJECTION, SOLUTION INTRAVENOUS at 13:51

## 2021-12-03 RX ADMIN — MIDAZOLAM HYDROCHLORIDE 2 MG: 1 INJECTION, SOLUTION INTRAMUSCULAR; INTRAVENOUS at 12:46

## 2021-12-03 RX ADMIN — DEXMEDETOMIDINE 10 MCG: 200 INJECTION, SOLUTION INTRAVENOUS at 13:17

## 2021-12-03 RX ADMIN — OXYCODONE HYDROCHLORIDE 5 MG: 5 SOLUTION ORAL at 15:05

## 2021-12-03 RX ADMIN — FENTANYL CITRATE 50 MCG: 50 INJECTION, SOLUTION INTRAMUSCULAR; INTRAVENOUS at 13:47

## 2021-12-03 RX ADMIN — FUROSEMIDE 5 MG: 10 INJECTION, SOLUTION INTRAMUSCULAR; INTRAVENOUS at 13:39

## 2021-12-03 RX ADMIN — SODIUM CHLORIDE, POTASSIUM CHLORIDE, SODIUM LACTATE AND CALCIUM CHLORIDE: 600; 310; 30; 20 INJECTION, SOLUTION INTRAVENOUS at 12:00

## 2021-12-03 RX ADMIN — LIDOCAINE HYDROCHLORIDE 60 MG: 20 INJECTION, SOLUTION EPIDURAL; INFILTRATION; INTRACAUDAL at 12:50

## 2021-12-03 RX ADMIN — ACETAMINOPHEN 1000 MG: 500 TABLET ORAL at 12:03

## 2021-12-03 RX ADMIN — FENTANYL CITRATE 50 MCG: 50 INJECTION, SOLUTION INTRAMUSCULAR; INTRAVENOUS at 12:57

## 2021-12-03 RX ADMIN — FLUORESCEIN SODIUM 0.5 ML: 100 INJECTION INTRAVENOUS at 13:30

## 2021-12-03 RX ADMIN — PROPOFOL 200 MG: 10 INJECTION, EMULSION INTRAVENOUS at 12:50

## 2021-12-03 RX ADMIN — FENTANYL CITRATE 25 MCG: 50 INJECTION, SOLUTION INTRAMUSCULAR; INTRAVENOUS at 15:03

## 2021-12-03 RX ADMIN — EPHEDRINE SULFATE 10 MG: 50 INJECTION, SOLUTION INTRAVENOUS at 13:11

## 2021-12-03 RX ADMIN — SODIUM CHLORIDE, POTASSIUM CHLORIDE, SODIUM LACTATE AND CALCIUM CHLORIDE: 600; 310; 30; 20 INJECTION, SOLUTION INTRAVENOUS at 13:41

## 2021-12-03 RX ADMIN — CELECOXIB 200 MG: 200 CAPSULE ORAL at 12:03

## 2021-12-03 ASSESSMENT — PAIN DESCRIPTION - PAIN TYPE
TYPE: SURGICAL PAIN

## 2021-12-03 ASSESSMENT — FIBROSIS 4 INDEX: FIB4 SCORE: 1.28

## 2021-12-03 ASSESSMENT — PAIN SCALES - GENERAL: PAIN_LEVEL: 4

## 2021-12-03 NOTE — OR SURGEON
Immediate Post OP Note    PreOp Diagnosis:     1. Symptomatic cystocele   2.Genuine stress urinary incontinence.    3. Previous hysterectomy    PostOp Diagnosis: same      Procedure(s):  COLPORRHAPHY, ANTERIOR - Wound Class: Clean Contaminated  BLADDER SLING, FEMALE - RECTROPUBIC SLING - Wound Class: Clean Contaminated  CYSTOSCOPY - Wound Class: Clean Contaminated    Surgeon(s):  IRMA Villa M.D.    Anesthesiologist/Type of Anesthesia:  Anesthesiologist: Marilin Vazquez M.D./General    Surgical Staff:  Circulator: Mendel Valladares R.N.; Anna Barnhart R.N.  Scrub Person: Cookie Mora    Specimens removed if any:  none    Estimated Blood Loss: less than 50 cc  UO: 100 cc with fluorescein  IVF:1300 cc of LR    Findings: Normal vaginal mucosa, grade III cystocele, normal bladder mucosa with efflux of fluorescein both ureters.     Complications: none        12/3/2021 1:53 PM Micaela Valdes M.D.

## 2021-12-03 NOTE — ANESTHESIA PREPROCEDURE EVALUATION
Case: [id number] Date/Time: 12/03/21 1230    Procedures:       COLPORRHAPHY, ANTERIOR      BLADDER SLING, FEMALE - RECTROPUBIC SLING AND ANY OTHER INDICATED PROCEDURES    Pre-op diagnosis: STRESS INCONTINENCE, URGE INCONTINENCE, RECTOCELE, CYSTOCELE    Location: [facility] ROOM 23 / SURGERY SAME DAY [facility]    Surgeons: Micaela Valdes M.D.          Relevant Problems   GI   (positive) Gastroesophageal reflux disease      Other   (positive) Bladder prolapse, female, acquired   (positive) Depression   (positive) Pure hypercholesterolemia       Physical Exam    Airway   Mallampati: II  TM distance: >3 FB  Neck ROM: full       Cardiovascular - normal exam  Rhythm: regular  Rate: normal  (-) murmur     Dental - normal exam           Pulmonary - normal exam  Breath sounds clear to auscultation     Abdominal    Neurological - normal exam                 Anesthesia Plan    ASA 2       Plan - general       Airway plan will be ETT          Induction: intravenous    Postoperative Plan: Postoperative administration of opioids is intended.    Pertinent diagnostic labs and testing reviewed    Informed Consent:    Anesthetic plan and risks discussed with patient.    Use of blood products discussed with: patient whom consented to blood products.

## 2021-12-03 NOTE — ANESTHESIA TIME REPORT
Anesthesia Start and Stop Event Times     Date Time Event    12/3/2021 1232 Ready for Procedure     1244 Anesthesia Start     1408 Anesthesia Stop        Responsible Staff  12/03/21    Name Role Begin End    Marilin Vazquez M.D. Anesth 1244 1403        Preop Diagnosis (Free Text):  Pre-op Diagnosis     STRESS INCONTINENCE, URGE INCONTINENCE, RECTOCELE, CYSTOCELE        Preop Diagnosis (Codes):    Premium Reason  Non-Premium    Comments:

## 2021-12-03 NOTE — OR NURSING
1406 - Arrived from or via gurney, somnolent/sedated. Report received from anesthesiologist and RN    1425 - Remains asleep    1430 - Son updated. Will go home and return when patient is close to discharge    1502 - Fentanyl 25mcg lower abdominal discomfort 4/10    1505 - oxycodone 5mg for 4/10 lower abdominal discomfort. Tolerates sips of water    1520 - Phase 1 recovery completed. Pain 2/10    1535 - report off to MICHELA Walters

## 2021-12-03 NOTE — ANESTHESIA POSTPROCEDURE EVALUATION
Patient: Jamilah Stone    Procedure Summary     Date: 12/03/21 Room / Location: [facility] ROOM 23 / SURGERY SAME DAY [facility]    Anesthesia Start: 1244 Anesthesia Stop: 1408    Procedures:       COLPORRHAPHY, ANTERIOR (N/A Vagina )      BLADDER SLING, FEMALE - RECTROPUBIC SLING (N/A Bladder)      CYSTOSCOPY (N/A Urethra) Diagnosis: (CYSTOCELE)    Surgeons: Micaela Valdes M.D. Responsible Provider: Marilin Vazquez M.D.    Anesthesia Type: general ASA Status: 2          Final Anesthesia Type: general  Last vitals  BP   Blood Pressure: 115/71    Temp   37.5 °C (99.5 °F)    Pulse   74   Resp   16    SpO2   92 %      Anesthesia Post Evaluation    Patient location during evaluation: PACU  Patient participation: complete - patient participated  Level of consciousness: awake and alert  Pain score: 4    Airway patency: patent  Anesthetic complications: no  Cardiovascular status: hemodynamically stable  Respiratory status: acceptable  Hydration status: euvolemic    PONV: none          No complications documented.     Nurse Pain Score: 4 (NPRS)

## 2021-12-03 NOTE — OR NURSING
1535 Report received from Maulik ABERNATHY and care of patient assumed at this time. Patient resting, denies any needs at this time.     1545 Updated MD on patient condition; ok to dc vaginal packing & robertson now per Dr Vadles.     1557 Vaginal packing dc'd and bladder filled with 300 ml sterile water prior to robertson removal.     1620 Pt up to bathroom with standby assistance; unable to void.     1730 Robertson placed and 700ml urine drained. Dr Valdes notified; instruction received to educate patient to clamp/unclamp every 3-4 hours while awake and to unclamp to drain while asleep. Instructed patient to call office on Monday; will have robertson removed in office Monday or Tuesday.     1740 Discharge instructions reviewed with son Brandon in [facility].     1814 escorted patient to car via wheelchair, accompanied by RN. All personal belongings and discharge instructions with patient.

## 2021-12-03 NOTE — DISCHARGE INSTRUCTIONS
ACTIVITY: Rest and take it easy for the first 24 hours.  A responsible adult is recommended to remain with you during that time.  It is normal to feel sleepy.  We encourage you to not do anything that requires balance, judgment or coordination.    MILD FLU-LIKE SYMPTOMS ARE NORMAL. YOU MAY EXPERIENCE GENERALIZED MUSCLE ACHES, THROAT IRRITATION, HEADACHE AND/OR SOME NAUSEA.    FOR 24 HOURS DO NOT:  Drive, operate machinery or run household appliances.  Drink beer or alcoholic beverages.   Make important decisions or sign legal documents.    SPECIAL INSTRUCTIONS:       Anterior and Posterior Colporrhaphy and Sling Procedure, Care After  This sheet gives you information about how to care for yourself after your procedure. Your health care provider may also give you more specific instructions. If you have problems or questions, contact your health care provider.  What can I expect after the procedure?  After the procedure, it is common to have:  · Pain in the surgical area.  · Vaginal discharge. You will need to use a sanitary pad during this time.  · Fatigue.  Follow these instructions at home:  Incision care    · Follow instructions from your health care provider about how to take care of your incision. Make sure you:  ? Wash your hands with soap and water before touching the incision area. If soap and water are not available, use hand .  ? Clean your incision as told by your health care provider.  ? Leave stitches (sutures), skin glue, or adhesive strips in place. These skin closures may need to stay in place for 2 weeks or longer. If adhesive strip edges start to loosen and curl up, you may trim the loose edges. Do not remove adhesive strips completely unless your health care provider tells you to do that.  · Check your incision area every day for signs of infection. Check for:  ? Redness, swelling, or pain.  ? Fluid or blood.  ? Warmth.  ? Pus or a bad smell.  · Check your incision every day to make sure  the incision area is not  or opening.  · Do not take baths, swim, or use a hot tub until your health care provider approves. You may shower.  · Keep the area between your vagina and rectum (perineal area) clean and dry. Make sure you clean the area after each bowel movement and each time you urinate.  · Ask your health care provider if you can take a sitz bath or sit in a tub of clean, warm water.  Activity  · Do gentle, daily activity as told by your health care provider. You may be told to take short walks every day and go farther each time. Ask your health care provider what activities are safe for you.  · Limit stair climbing to once or twice a day in the first week, then slowly increase this activity.  · Do not lift anything that is heavier than 10 lbs. (4.5 kg), or the limit that your health care provider tells you, until he or she says that it is safe. Avoid pushing or pulling motions.  · Avoid standing for long periods of time.  · Do not douche, use tampons, or have sex until your health care provider says it is okay.  · Do not drive or use heavy machinery while taking prescription pain medicine.  To prevent constipation  · To prevent or treat constipation while you are taking prescription pain medicine, your health care provider may recommend that you:  ? Take over-the-counter or prescription medicines.  ? Eat foods that are high in fiber, such as fresh fruits and vegetables, whole grains, and beans.  ? Drink enough fluid to keep your urine clear or pale yellow.  ? Limit foods that are high in fat and processed sugars, such as fried and sweet foods.  General instructions  · You may be instructed to do pelvic floor exercises (kegels) as told by your health care provider.  · Take over-the-counter and prescription medicines only as told by your health care provider.  · Keep all follow-up visits as told by your health care provider. This is important.  Contact a health care provider if:  · Medicine  does not help your pain.  · You have frequent or urgent urination, or you are unable to completely empty your bladder.  · You feel a burning sensation when urinating.  · You have fluid or blood coming from your incision.  · You have pus or a bad smell coming from the incision.  · Your incision feels warm to the touch.  · You have redness, swelling, or pain around your incision.  Get help right away if:  · You have a fever or chills.  · Your incision separates or opens.  · You cannot urinate.  · You have trouble breathing.  Summary  · After the procedure, it is common to have pain, fatigue, and discharge from the vagina.  · Keep the area between your vagina and rectum (perineal area) clean and dry. Make sure you clean the area after each bowel movement and each time you urinate.  · Follow instructions from your health care provider on any activity restrictions after the procedure.        DIET: To avoid nausea, slowly advance diet as tolerated, avoiding spicy or greasy foods for the first day.  Add more substantial food to your diet according to your physician's instructions.  Babies can be fed formula or breast milk as soon as they are hungry.  INCREASE FLUIDS AND FIBER TO AVOID CONSTIPATION.    SURGICAL DRESSING/BATHING: May shower the day after surgery. Do not take a bath or submerge in water until approved by your physician    FOLLOW-UP APPOINTMENT:  A follow-up appointment should be arranged with your doctor in, call to schedule.    You should CALL YOUR PHYSICIAN if you develop:  Fever greater than 101 degrees F.  Pain not relieved by medication, or persistent nausea or vomiting.  Excessive bleeding (blood soaking through dressing) or unexpected drainage from the wound.  Extreme redness or swelling around the incision site, drainage of pus or foul smelling drainage.  Inability to urinate or empty your bladder within 8 hours.  Problems with breathing or chest pain.    You should call 911 if you develop problems  with breathing or chest pain.  If you are unable to contact your doctor or surgical center, you should go to the nearest emergency room or urgent care center.    Physician's telephone #: [phone]    If any questions arise, call your doctor.  If your doctor is not available, please feel free to call the Surgical Center at [phone].     A registered nurse may call you a few days after your surgery to see how you are doing after your procedure.    MEDICATIONS: Resume taking daily medication.  Take prescribed pain medication with food.  If no medication is prescribed, you may take non-aspirin pain medication if needed.  PAIN MEDICATION CAN BE VERY CONSTIPATING.  Take a stool softener or laxative such as senokot, pericolace, or milk of magnesia if needed.    Prescription given for Oxycodone, ibuprofen, docusate sodium     Last pain medication given at _____________________________.    If your physician has prescribed pain medication that includes Acetaminophen (Tylenol), do not take additional Acetaminophen (Tylenol) while taking the prescribed medication.    Depression / Suicide Risk    As you are discharged from this [facility] facility, it is important to learn how to keep safe from harming yourself.    Recognize the warning signs:  · Abrupt changes in personality, positive or negative- including increase in energy   · Giving away possessions  · Change in eating patterns- significant weight changes-  positive or negative  · Change in sleeping patterns- unable to sleep or sleeping all the time   · Unwillingness or inability to communicate  · Depression  · Unusual sadness, discouragement and loneliness  · Talk of wanting to die  · Neglect of personal appearance   · Rebelliousness- reckless behavior  · Withdrawal from people/activities they love  · Confusion- inability to concentrate     If you or a loved one observes any of these behaviors or has concerns about self-harm, here's what you can do:  · Talk  about it- your feelings and reasons for harming yourself  · Remove any means that you might use to hurt yourself (examples: pills, rope, extension cords, firearm)  · Get professional help from the community (Mental Health, Substance Abuse, psychological counseling)  · Do not be alone:Call your Safe Contact- someone whom you trust who will be there for you.  · Call your local CRISIS HOTLINE 897-9764 or 511-400-9588  · Call your local Children's Mobile Crisis Response Team [place] (938) 807-2697 or wwwPager  · Call the toll free National Suicide Prevention Hotlines   · National Suicide Prevention Lifeline 404-938-HOOJ (7873)  · National Hope Line Network 800-SUICIDE (092-9100)

## 2021-12-04 NOTE — OP REPORT
DATE OF SERVICE:  12/03/2021     PREOPERATIVE DIAGNOSES:  1.  Symptomatic cystocele.  2.  Genuine stress urinary incontinence.  3.  Previous hysterectomy.     POSTOPERATIVE DIAGNOSES:  1.  Symptomatic cystocele.  2.  Genuine stress urinary incontinence.  3.  Previous hysterectomy.     PROCEDURES:  1.  Exam under anesthesia.  2.  Anterior colporrhaphy.  3.  Retropubic sling via the Lynx.  4.  Cystoscopy.     SURGEON:  Micaela Valdes MD     ASSISTANT:  Danny Figueroa MD     ANESTHESIOLOGIST:  Marilin Vazquez MD     TYPE OF ANESTHESIA:  General endotracheal anesthesia.     IV FLUIDS:  1300 mL of LR.     URINE OUTPUT:  100 mL with fluorescein at the end of the procedure.     ESTIMATED BLOOD LOSS:  Less than 50 mL     COMPLICATIONS:  None.     RECOVERY:  Stable to the PACU.     FINDINGS:  Normal vaginal mucosa, absent cervix and absent uterus, grade III   cystocele.  Normal bladder mucosa with efflux of fluorescein from both   ureters.     DESCRIPTION OF PROCEDURE:  The patient was taken to the operating room where   she received uncomplicated general endotracheal anesthesia.  She was placed on   Cong stirrups, prepped and draped in the usual sterile fashion.  An exam   under anesthesia revealed the above findings.  At this time, she did receive 2   grams of Ancef.  I placed a Hernandez.  A weighted speculum was placed in the   vagina.  An Allis clamp was placed one 2 cm below the urethral opening and one   at the previous hysterectomy vaginal cuff.  At this time, 10 mL of Marcaine   with 0.25% epinephrine was injected in the vaginal mucosa.  She had a grade   III cystocele and at this time, using an 11 blade scalpel both Allis clamps   were connected making a vertical vaginal incision right over the cystocele.    At this time, Allis clamps were placed to the right and to the left of the   vaginal mucosa. Using Metzenbaum scissors and blunt dissection, the   pubocervical fascia was dissected off of the vaginal mucosa both  to the left   and then to the right side of the vertical incision.  This was taken down all   the way to the pubic rami on the left and then to the right side.  Once all   the pubocervical fascia had been dissected off of the vaginal mucosa, a [place]   plication was performed to reduce the cystocele and complete the anterior   colporrhaphy.  At this time, 2-0 Ethibond on a CT-2 needle was used to make a   [place] plication and therefore reducing the cystocele.  This was done with   multiple interrupted sutures.  This was done without any problems.  At this   time, attention was then placed to the mons pubis, 2 cm to the right and 2 cm   to the left of the symphysis pubis. Using a marker, the area was marked.  A 5   mL of Marcaine with 0.25% epinephrine was injected on these markings sites, 2   cm to the left and 2 cm to the right of the mons pubis. A stab incision was   made with a #11 blade on each of these sites and then at this time, the Lynx   needle trocars were handed over to me.  First, the left mons pubis incision   was noted and using the Lynx needle trocar, this was introduced from top down.    The needle was introduced as the surgeon's index finger guided it as it came   down behind the pubic rami and the vagina.  The needle punctured through   without any problems.  In the same way, the next Lynx needle trocar was handed   over to me and now the top right suprapubic incision was noted and again   using the top down method the surgeon's index finger guided as the needle came   through below the pubic rami and punctured through without any problems.  At   this time, the Hernandez was deflated and using a 70-degree cystoscope, cystoscopy   was performed.  Visualization of the bladder revealed that the bladder mucosa   was intact.  No evidence of any cystotomy.  There was effluxing of urine from   both ureters.  Therefore, at this time, the cystoscope was removed.  The   Hernandez was replaced and all the irrigant  inside of the bladder and the urine   was drained and then at this time, the mesh for the sling was connected into   both the needle Lynx trocars.  This was done without any problems and the Lynx   sleeves were brought out from the top.  A #8 Hegar was placed in the   midurethral area ensuring that the lynx mesh was tension free in the mid   urethral area.  This was done without any problems and then the #8 Hegar was   removed.  Again, the Hernandez was deflated and removed.  Using a 70-degree   cystoscope, cystoscopy was performed.  Again, there was no evidence of any   cystotomy.  The anesthesiologist had given the patient IV fluorescein and   there was efflux of fluorescein from both ureters and again, the bladder   mucosa was intact.  Therefore, at this time, the Hernandez was replaced and   visualization of the vagina revealed that it was hemostatic.  Therefore, at   this time, the vaginal mucosa was approximated with a 2-0 Vicryl on a CT1   needle in a running fashion.  The vagina was then packed with a vaginal pack.    The Hernandez had already been replaced.  The patient was then taken out of [facility].  She woke up from anesthesia without any problems and was taken to   the recovery room in stable condition.  Lap and needle counts were correct x2.    She did receive 2 grams of Ancef before the start of surgery.        ______________________________  MD SAMEER DOLL/NAMAN    DD:  12/03/2021 14:15  DT:  12/03/2021 16:01    Job#:  [id number]    CC:MD Marilin NAGEL MD Javier A. Rodriguez, MD

## 2022-02-17 ENCOUNTER — OFFICE VISIT (OUTPATIENT)
Dept: MEDICAL GROUP | Facility: PHYSICIAN GROUP | Age: 52
End: 2022-02-17
Payer: COMMERCIAL

## 2022-02-17 VITALS
SYSTOLIC BLOOD PRESSURE: 120 MMHG | TEMPERATURE: 97.5 F | BODY MASS INDEX: 26.32 KG/M2 | WEIGHT: 154.2 LBS | HEART RATE: 83 BPM | HEIGHT: 64 IN | OXYGEN SATURATION: 96 % | DIASTOLIC BLOOD PRESSURE: 78 MMHG

## 2022-02-17 DIAGNOSIS — D72.819 LEUKOPENIA, UNSPECIFIED TYPE: ICD-10-CM

## 2022-02-17 DIAGNOSIS — J34.89 SINUS PRESSURE: ICD-10-CM

## 2022-02-17 DIAGNOSIS — Z00.00 WELL WOMAN EXAM (NO GYNECOLOGICAL EXAM): ICD-10-CM

## 2022-02-17 DIAGNOSIS — M77.00 GOLFER'S ELBOW, UNSPECIFIED LATERALITY: ICD-10-CM

## 2022-02-17 PROCEDURE — 99214 OFFICE O/P EST MOD 30 MIN: CPT | Performed by: FAMILY MEDICINE

## 2022-02-17 ASSESSMENT — PATIENT HEALTH QUESTIONNAIRE - PHQ9: CLINICAL INTERPRETATION OF PHQ2 SCORE: 0

## 2022-02-17 ASSESSMENT — FIBROSIS 4 INDEX: FIB4 SCORE: 1.28

## 2022-02-17 NOTE — ASSESSMENT & PLAN NOTE
Chronic issue  Suffers from nasal congestion and has had frontal sinus pressure  claritin has not helped  Has tried flonase with some help

## 2022-02-17 NOTE — PROGRESS NOTES
"Subjective:     Chief Complaint   Patient presents with   • Headache     Front, Claritin D not working,    • Elbow Pain     Elbow Joint, Burning sensation,x 2-3 month,Worsening , Both elbow joints   • Results       HPI:   Jamilah presents today to discuss the following.    Sinus pressure  Chronic issue  Suffers from nasal congestion and has had frontal sinus pressure  claritin has not helped  Has tried flonase with some help     Golfer's elbow, unspecified laterality  Chronic issue  Has had this for the past 3 months      Leukopenia  Chronic issue  Negative HIV  stable      Past Medical History:   Diagnosis Date   • Carpal tunnel syndrome    • Depression 5/25/2021   • GERD (gastroesophageal reflux disease)     at times with greasy food, tums or zantac,   • Heart burn    • Indigestion    • Pure hypercholesterolemia 8/23/2016       Current Outpatient Medications Ordered in Epic   Medication Sig Dispense Refill   • ibuprofen (MOTRIN) 600 MG Tab Take 1 Tablet by mouth every 6 hours as needed. 60 Tablet 1   • Sennosides (SENOKOT PO) Take  by mouth.     • sertraline (ZOLOFT) 25 MG tablet Take 25 mg by mouth every day.     • loratadine (CLARITIN) 10 MG Tab Take 1 Tab by mouth every day. 30 Tab 2     No current Epic-ordered facility-administered medications on file.       Allergies:  Patient has no known allergies.    Health Maintenance: Completed    ROS:  Gen: no fevers/chills, no changes in weight  Eyes: no changes in vision  Pulm: no sob, no cough  CV: no chest pain, no palpitations  GI: no nausea/vomiting, no diarrhea      Objective:     Exam:  /78 (BP Location: Right arm, Patient Position: Sitting, BP Cuff Size: Adult)   Pulse 83   Temp 36.4 °C (97.5 °F) (Temporal)   Ht 1.626 m (5' 4\")   Wt 69.9 kg (154 lb 3.2 oz)   LMP 03/01/2015   SpO2 96%   BMI 26.47 kg/m²  Body mass index is 26.47 kg/m².        Constitutional: Alert, no distress, well-groomed.  Skin: Warm, dry, good turgor, no rashes in visible " areas.  Eye: Equal, round and reactive, conjunctiva clear, lids normal.  ENMT: Lips without lesions, good dentition, moist mucous membranes.  Neck: Trachea midline, no masses, no thyromegaly.  Respiratory: Unlabored respiratory effort, no cough.  MSK: Normal gait, moves all extremities.  Neuro: Grossly non-focal.   Psych: Alert and oriented x3, normal affect and mood.        Assessment & Plan:     51 y.o. female with the following -     1. Sinus pressure  Chronic issue.  Unchanged.  I will introduce Allegra-d and Flonase twice daily.  Reassess in 3 months.    2. Golfer's elbow, unspecified laterality  Chronic, unchanged condition.  Recommend the use of elbow braces for tendinitis.  RICE therapy.    3. Leukopenia, unspecified type  Chronic issue.  Off and on.  Mild in nature.  Negative HIV.  Will order hepatitis panel as well.  - HEPATITIS PANEL ACUTE(4 COMPONENTS); Future    4. Well woman exam (no gynecological exam)  - VITAMIN D,25 HYDROXY; Future      Return in about 3 months (around 5/17/2022).    Please note that this dictation was created using voice recognition software. I have made every reasonable attempt to correct obvious errors, but I expect that there are errors of grammar and possibly content that I did not discover before finalizing the note.

## 2022-03-12 ENCOUNTER — HOSPITAL ENCOUNTER (OUTPATIENT)
Dept: LAB | Facility: MEDICAL CENTER | Age: 52
End: 2022-03-12
Attending: FAMILY MEDICINE
Payer: COMMERCIAL

## 2022-03-12 DIAGNOSIS — D72.819 LEUKOPENIA, UNSPECIFIED TYPE: ICD-10-CM

## 2022-03-12 DIAGNOSIS — Z00.00 WELL WOMAN EXAM (NO GYNECOLOGICAL EXAM): ICD-10-CM

## 2022-03-12 LAB
25(OH)D3 SERPL-MCNC: 48 NG/ML (ref 30–100)
HAV IGM SERPL QL IA: NORMAL
HBV CORE IGM SER QL: NORMAL
HBV SURFACE AG SER QL: NORMAL
HCV AB SER QL: NORMAL

## 2022-03-12 PROCEDURE — 80074 ACUTE HEPATITIS PANEL: CPT

## 2022-03-12 PROCEDURE — 36415 COLL VENOUS BLD VENIPUNCTURE: CPT

## 2022-03-12 PROCEDURE — 82306 VITAMIN D 25 HYDROXY: CPT

## 2022-06-06 ENCOUNTER — OFFICE VISIT (OUTPATIENT)
Dept: URGENT CARE | Facility: PHYSICIAN GROUP | Age: 52
End: 2022-06-06
Payer: COMMERCIAL

## 2022-06-06 VITALS
HEIGHT: 64 IN | TEMPERATURE: 97.9 F | SYSTOLIC BLOOD PRESSURE: 118 MMHG | DIASTOLIC BLOOD PRESSURE: 80 MMHG | OXYGEN SATURATION: 98 % | HEART RATE: 80 BPM | WEIGHT: 158 LBS | BODY MASS INDEX: 26.98 KG/M2 | RESPIRATION RATE: 18 BRPM

## 2022-06-06 DIAGNOSIS — R10.9 FLANK PAIN: ICD-10-CM

## 2022-06-06 DIAGNOSIS — R31.9 HEMATURIA, UNSPECIFIED TYPE: ICD-10-CM

## 2022-06-06 DIAGNOSIS — S29.019A THORACIC MYOFASCIAL STRAIN, INITIAL ENCOUNTER: ICD-10-CM

## 2022-06-06 LAB
APPEARANCE UR: CLEAR
BILIRUB UR STRIP-MCNC: NEGATIVE MG/DL
COLOR UR AUTO: NORMAL
GLUCOSE UR STRIP.AUTO-MCNC: NEGATIVE MG/DL
KETONES UR STRIP.AUTO-MCNC: NEGATIVE MG/DL
LEUKOCYTE ESTERASE UR QL STRIP.AUTO: NEGATIVE
NITRITE UR QL STRIP.AUTO: NEGATIVE
PH UR STRIP.AUTO: 5 [PH] (ref 5–8)
PROT UR QL STRIP: NEGATIVE MG/DL
RBC UR QL AUTO: NORMAL
SP GR UR STRIP.AUTO: 1.01
UROBILINOGEN UR STRIP-MCNC: 0.2 MG/DL

## 2022-06-06 PROCEDURE — 99214 OFFICE O/P EST MOD 30 MIN: CPT | Performed by: PHYSICIAN ASSISTANT

## 2022-06-06 PROCEDURE — 81002 URINALYSIS NONAUTO W/O SCOPE: CPT | Performed by: PHYSICIAN ASSISTANT

## 2022-06-06 RX ORDER — KETOROLAC TROMETHAMINE 30 MG/ML
30 INJECTION, SOLUTION INTRAMUSCULAR; INTRAVENOUS ONCE
Status: COMPLETED | OUTPATIENT
Start: 2022-06-06 | End: 2022-06-06

## 2022-06-06 RX ORDER — MELOXICAM 15 MG/1
15 TABLET ORAL DAILY
Qty: 30 TABLET | Refills: 0 | Status: SHIPPED
Start: 2022-06-06 | End: 2022-06-23

## 2022-06-06 RX ORDER — CYCLOBENZAPRINE HCL 10 MG
10 TABLET ORAL 3 TIMES DAILY PRN
Qty: 20 TABLET | Refills: 0 | Status: SHIPPED
Start: 2022-06-06 | End: 2022-06-23

## 2022-06-06 RX ADMIN — KETOROLAC TROMETHAMINE 30 MG: 30 INJECTION, SOLUTION INTRAMUSCULAR; INTRAVENOUS at 18:52

## 2022-06-06 ASSESSMENT — FIBROSIS 4 INDEX: FIB4 SCORE: 1.3

## 2022-06-07 NOTE — PROGRESS NOTES
Subjective:   Jamilah Stone is a 52 y.o. female who presents for Flank Pain (Right side,painful,x1 month)     Right sided flank pain x 1 month; worse since last night  The pain does not radiate  No hematuria, dysuria, or frequency  She has some acid reflex that has been longstanding but no nausea or vomiting  No fevers or chills.  Has had hysterectomy and bladder sling  Did try Ibuproen  Did have an episode at work with lifting something to her side while trying to open her car and she believes the symptoms may have started then.    She does not symptoms are aggravated with certain movements.      Medications:  estradiol  ibuprofen Tabs  loratadine Tabs  SENOKOT PO  sertraline    Allergies:             Patient has no known allergies.    Surgical History:         Past Surgical History:   Procedure Laterality Date   • AL ANTER COLPORRHAPHY,BLAD/VAGINA N/A 12/3/2021    Procedure: COLPORRHAPHY, ANTERIOR;  Surgeon: Micaela Valdes M.D.;  Location: SURGERY SAME DAY [facility];  Service: Gynecology   • AL SLING OPER STRES INCONTINENCE N/A 12/3/2021    Procedure: BLADDER SLING, FEMALE - RECTROPUBIC SLING;  Surgeon: Micaela Valdes M.D.;  Location: SURGERY SAME DAY [facility];  Service: Gynecology   • AL CYSTOURETHROSCOPY N/A 12/3/2021    Procedure: CYSTOSCOPY;  Surgeon: Micaela Valdes M.D.;  Location: SURGERY SAME DAY [facility];  Service: Gynecology   • HYSTERECTOMY, VAGINAL  3/2015    Marilee with BSO.  heavy menses with ovarian cysts=benign   • CHOLECYSTECTOMY     • PELVISCOPY      endometriosis       Past Social Hx:  Jamilah Stone  reports that she has never smoked. She has never used smokeless tobacco. She reports that she does not drink alcohol and does not use drugs.     Past Family Hx:   Jamilah Stone family history includes Cancer in her paternal grandfather and sister; Heart Disease in her maternal grandmother and mother; Hyperlipidemia in her father; Other in her  "maternal grandfather; Stroke in her maternal grandfather.       Problem list, medications, and allergies reviewed by myself today in Epic.     Objective:     /80 (BP Location: Left arm, Patient Position: Sitting, BP Cuff Size: Adult)   Pulse 80   Temp 36.6 °C (97.9 °F) (Temporal)   Resp 18   Ht 1.626 m (5' 4\")   Wt 71.7 kg (158 lb)   LMP 03/01/2015   SpO2 98%   BMI 27.12 kg/m²     Physical Exam  Vitals and nursing note reviewed.   Constitutional:       General: She is not in acute distress.     Appearance: Normal appearance. She is not ill-appearing, toxic-appearing or diaphoretic.   HENT:      Nose: Nose normal.   Eyes:      Extraocular Movements: Extraocular movements intact.   Cardiovascular:      Rate and Rhythm: Normal rate.      Pulses: Normal pulses.      Heart sounds: Normal heart sounds.   Pulmonary:      Effort: Pulmonary effort is normal.      Breath sounds: Normal breath sounds.   Abdominal:      General: There is no distension.      Palpations: Abdomen is soft.      Tenderness: There is no abdominal tenderness. There is no right CVA tenderness, left CVA tenderness, guarding or rebound.      Hernia: No hernia is present.   Musculoskeletal:      Thoracic back: Tenderness present.        Back:       Comments: There is mild tenderness palpation along the right thoracic paraspinous musculature.  There is no tenderness in the midline.  There is no obvious CVA tenderness.  Pain is aggravated with rotational activities.  No palpable deformity or step-off.   Neurological:      Mental Status: She is alert and oriented to person, place, and time.   Psychiatric:         Mood and Affect: Mood normal.         Behavior: Behavior is cooperative.       UA with trace blood intact, negative nitrate leukocyte protein glucose  Assessment/Plan:     Diagnosis and Associated Orders:     1. Flank pain  - POCT Urinalysis  - ketorolac (TORADOL) injection 30 mg  - cyclobenzaprine (FLEXERIL) 10 mg Tab; Take 1 Tablet " by mouth 3 times a day as needed for Muscle Spasms.  Dispense: 20 Tablet; Refill: 0  - meloxicam (MOBIC) 15 MG tablet; Take 1 Tablet by mouth every day.  Dispense: 30 Tablet; Refill: 0    2. Thoracic myofascial strain, initial encounter  - CT-RENAL COLIC EVALUATION(A/P W/O); Future    3. Hematuria, unspecified type        Comments/MDM:    Patient presents with chief complaint of right flank pain of over 1 months duration recently aggravated prompting evaluation today.  UA with trace blood intact.  Patient has had hysterectomy.  Pain started after lifting injury.  I can somewhat reproduce the pain with palpation to the area.  Thoracic range of motion is full.  I did discuss that this could be urolithiasis although the onset of symptoms around the time of a lifting injury makes this diagnosis less likely.  We will treat with Toradol 30 mg IM.  Flexeril 10 mg every 8 hours as needed for spasm caution sedation.  Meloxicam daily.  Discussed only way to rule out urolithiasis and his CT renal colic.  I do not feel this needs to be performed on an emergent basis.  Orders placed.  Patient will call to schedule in the a.m.  Strict ED precautions discussed.  Adequate hydration, NSAIDs as above.    I personally reviewed prior external notes and test results pertinent to today's visit.  Red flags discussed as well as indications to present to the Emergency Department.  Supportive care, natural history, differential diagnoses, and indications for immediate follow-up discussed.  Patient expresses understanding and agrees to plan.  Patient denies any other questions or concerns.    Follow-up with the primary care physician for recheck, reevaluation, and consideration of further management.      Please note that this dictation was created using voice recognition software. I have made a reasonable attempt to correct obvious errors, but I expect that there are errors of grammar and possibly content that I did not discover before  finalizing the note.    This note was electronically signed by Anika Del Valle PA-C

## 2022-06-14 ENCOUNTER — HOSPITAL ENCOUNTER (OUTPATIENT)
Dept: RADIOLOGY | Facility: MEDICAL CENTER | Age: 52
End: 2022-06-14
Attending: PHYSICIAN ASSISTANT
Payer: COMMERCIAL

## 2022-06-14 DIAGNOSIS — S29.019A THORACIC MYOFASCIAL STRAIN, INITIAL ENCOUNTER: ICD-10-CM

## 2022-06-14 PROCEDURE — 74176 CT ABD & PELVIS W/O CONTRAST: CPT

## 2022-06-23 ENCOUNTER — OFFICE VISIT (OUTPATIENT)
Dept: MEDICAL GROUP | Facility: PHYSICIAN GROUP | Age: 52
End: 2022-06-23
Payer: COMMERCIAL

## 2022-06-23 VITALS
WEIGHT: 154 LBS | SYSTOLIC BLOOD PRESSURE: 110 MMHG | DIASTOLIC BLOOD PRESSURE: 68 MMHG | BODY MASS INDEX: 26.29 KG/M2 | OXYGEN SATURATION: 95 % | TEMPERATURE: 97.6 F | HEIGHT: 64 IN | HEART RATE: 85 BPM

## 2022-06-23 DIAGNOSIS — R00.2 PALPITATIONS: ICD-10-CM

## 2022-06-23 DIAGNOSIS — F41.9 ANXIETY: ICD-10-CM

## 2022-06-23 PROCEDURE — 99214 OFFICE O/P EST MOD 30 MIN: CPT | Performed by: FAMILY MEDICINE

## 2022-06-23 RX ORDER — HYDROXYZINE HYDROCHLORIDE 25 MG/1
25 TABLET, FILM COATED ORAL 3 TIMES DAILY PRN
Qty: 30 TABLET | Refills: 0 | Status: SHIPPED | OUTPATIENT
Start: 2022-06-23 | End: 2023-06-30

## 2022-06-23 ASSESSMENT — FIBROSIS 4 INDEX: FIB4 SCORE: 1.3

## 2022-06-23 NOTE — PROGRESS NOTES
"Subjective:     Chief Complaint   Patient presents with   • ED Follow-Up     [facility] Follow up, results CT,    • Shortness of Breath     Pt states x 2 months, occ palpitations        HPI:   Jamilah presents today to discuss the following.  Prior PCP:     Palpitations  New problem  Short lived palpitations for a few seconds. Sometimes related to activity.  Denies chest pain  She does feel like she has to take a deep breath but no SOB  Has tried PPI which helped some  Does have some anxiety     Anxiety  Chronic issue  May be having panic attacks   Currently taking zoloft 25mg      Past Medical History:   Diagnosis Date   • Carpal tunnel syndrome    • Depression 5/25/2021   • GERD (gastroesophageal reflux disease)     at times with greasy food, tums or zantac,   • Heart burn    • Indigestion    • Pure hypercholesterolemia 8/23/2016       Current Outpatient Medications Ordered in Epic   Medication Sig Dispense Refill   • hydrOXYzine HCl (ATARAX) 25 MG Tab Take 1 Tablet by mouth 3 times a day as needed for Anxiety. 30 Tablet 0   • sertraline (ZOLOFT) 50 MG Tab Take 1 Tablet by mouth every day for 90 days. 90 Tablet 0   • estradiol (ESTRACE) 0.1 MG/GM vaginal cream      • Sennosides (SENOKOT PO) Take  by mouth.     • loratadine (CLARITIN) 10 MG Tab Take 1 Tab by mouth every day. 30 Tab 2     No current Epic-ordered facility-administered medications on file.       Allergies:  Patient has no known allergies.    Health Maintenance: Completed    ROS:  Gen: no fevers/chills, no changes in weight  Eyes: no changes in vision  Pulm: no sob, no cough  CV: no chest pain, no palpitations  GI: no nausea/vomiting, no diarrhea      Objective:     Exam:  /68 (BP Location: Right arm, Patient Position: Sitting, BP Cuff Size: Adult)   Pulse 85   Temp 36.4 °C (97.6 °F) (Temporal)   Ht 1.626 m (5' 4\")   Wt 69.9 kg (154 lb)   LMP 03/01/2015   SpO2 95%   BMI 26.43 kg/m²  Body mass index is 26.43 kg/m².        Constitutional: Alert, no " distress, well-groomed.  Skin: Warm, dry, good turgor, no rashes in visible areas.  Eye: Equal, round and reactive, conjunctiva clear, lids normal.  ENMT: Lips without lesions, good dentition, moist mucous membranes.  Neck: Trachea midline, no masses, no thyromegaly.  Respiratory: Unlabored respiratory effort, no cough.  MSK: Normal gait, moves all extremities.  Neuro: Grossly non-focal.   Psych: Alert and oriented x3, normal affect and mood.        Assessment & Plan:     52 y.o. female with the following -     1. Palpitations  New problem.  Unknown etiology and prognosis though I suspect that this is likely due to acid reflux versus anxiety.  I will refer to cardiology for further evaluation.  Strict emergency room precautions given.  Denies any chest pain.  Baseline blood work will be established today to rule out secondary causes.  - REFERRAL TO CARDIOLOGY  - Lipid Profile; Future  - TSH WITH REFLEX TO FT4; Future  - CBC WITHOUT DIFFERENTIAL; Future    2. Anxiety  Chronic condition.  Worsening.  We will up the dose on Zoloft today and reassess in 4 weeks.      Return in about 4 weeks (around 7/21/2022).    Please note that this dictation was created using voice recognition software. I have made every reasonable attempt to correct obvious errors, but I expect that there are errors of grammar and possibly content that I did not discover before finalizing the note.

## 2022-06-23 NOTE — ASSESSMENT & PLAN NOTE
New problem  Short lived palpitations for a few seconds. Sometimes related to activity.  Denies chest pain  She does feel like she has to take a deep breath but no SOB  Has tried PPI which helped some  Does have some anxiety

## 2022-06-23 NOTE — LETTER
[address]  [facility] [place]  [address]  [address]  [place] [address]     June 23, 2022    Patient: Jamilah Stone   YOB: 1970   Date of Visit: 6/23/2022       To Whom It May Concern:    Jamilah Martinez was seen and treated in our department on 6/23/2022.     Sincerely,     Alfredo Gomez M.D.

## 2022-07-15 ENCOUNTER — HOSPITAL ENCOUNTER (OUTPATIENT)
Dept: LAB | Facility: MEDICAL CENTER | Age: 52
End: 2022-07-15
Attending: FAMILY MEDICINE
Payer: COMMERCIAL

## 2022-07-15 ENCOUNTER — HOSPITAL ENCOUNTER (OUTPATIENT)
Facility: MEDICAL CENTER | Age: 52
End: 2022-07-15
Attending: PATHOLOGY
Payer: COMMERCIAL

## 2022-07-15 DIAGNOSIS — Z00.6 RESEARCH STUDY PATIENT: ICD-10-CM

## 2022-07-15 DIAGNOSIS — R00.2 PALPITATIONS: ICD-10-CM

## 2022-07-15 LAB
CHOLEST SERPL-MCNC: 182 MG/DL (ref 100–199)
ERYTHROCYTE [DISTWIDTH] IN BLOOD BY AUTOMATED COUNT: 42.8 FL (ref 35.9–50)
FASTING STATUS PATIENT QL REPORTED: NORMAL
HCT VFR BLD AUTO: 41.7 % (ref 37–47)
HDLC SERPL-MCNC: 55 MG/DL
HGB BLD-MCNC: 13.6 G/DL (ref 12–16)
LDLC SERPL CALC-MCNC: 117 MG/DL
MCH RBC QN AUTO: 29.3 PG (ref 27–33)
MCHC RBC AUTO-ENTMCNC: 32.6 G/DL (ref 33.6–35)
MCV RBC AUTO: 89.9 FL (ref 81.4–97.8)
PLATELET # BLD AUTO: 189 K/UL (ref 164–446)
PMV BLD AUTO: 11.4 FL (ref 9–12.9)
RBC # BLD AUTO: 4.64 M/UL (ref 4.2–5.4)
TRIGL SERPL-MCNC: 52 MG/DL (ref 0–149)
TSH SERPL DL<=0.005 MIU/L-ACNC: 1.29 UIU/ML (ref 0.38–5.33)
WBC # BLD AUTO: 2.6 K/UL (ref 4.8–10.8)

## 2022-07-15 PROCEDURE — 85027 COMPLETE CBC AUTOMATED: CPT

## 2022-07-15 PROCEDURE — 84443 ASSAY THYROID STIM HORMONE: CPT

## 2022-07-15 PROCEDURE — 36415 COLL VENOUS BLD VENIPUNCTURE: CPT

## 2022-07-15 PROCEDURE — 80061 LIPID PANEL: CPT

## 2022-07-18 DIAGNOSIS — Z00.6 RESEARCH STUDY PATIENT: ICD-10-CM

## 2022-07-19 ENCOUNTER — TELEPHONE (OUTPATIENT)
Dept: MEDICAL GROUP | Facility: PHYSICIAN GROUP | Age: 52
End: 2022-07-19
Payer: COMMERCIAL

## 2022-07-19 NOTE — TELEPHONE ENCOUNTER
Phone Number Called: [phone]     Call outcome: Spoke to patient regarding message below.    Message: Spoke to Patients son (ok to discuss treatment with Brandon Narvaez) and informed him of the following results:  Tiroides normal  Colesterol ha odin  Globulos blancos siempre corren bajos.    Patient son will relay message.

## 2022-07-19 NOTE — TELEPHONE ENCOUNTER
Phone Number Called: [phone]    Call outcome: Did not leave a detailed message. Requested patient to call back.    Message: LVM to please call back regarding results.

## 2022-07-19 NOTE — TELEPHONE ENCOUNTER
----- Message from Alfredo Gomez M.D. sent at 7/19/2022  1:21 PM PDT -----  Please call patient to let know:    Tiroides normal  Colesterol ha odin  Globulos blancos siempre sarkisn sajan

## 2022-07-20 LAB
ELF SCORE: 8.9
RELATIVE RISK: NORMAL
RISK GROUP: NORMAL
RISK: 3.3 %

## 2022-09-07 NOTE — ASSESSMENT & PLAN NOTE
Chronic issue  Negative HIV  stable   Patient complains of persistent hemorrhoid. Denies pain or bleeding. Dr. Joseph prescribed hydrocortisone cream and he is not really sure how to use it. Instructed to insert into rectum twice daily and may use to push hemorrhoid back in after bowel movement. Discussed prevention of constipation and avoiding sitting on the toilet for long periods.

## 2023-06-01 ENCOUNTER — TELEPHONE (OUTPATIENT)
Dept: CARDIOLOGY | Facility: MEDICAL CENTER | Age: 53
End: 2023-06-01
Payer: COMMERCIAL

## 2023-06-01 NOTE — TELEPHONE ENCOUNTER
Called patient in regards to records for NP appointment with Dr. Zuluaga. To review most recent lab results, ekg, any cardiac testing or ,if patient  has been treated by a cardiologist. No answer, left voicemail to call back

## 2023-06-06 NOTE — TELEPHONE ENCOUNTER
Caller:  Brandon (son)    Topic/issue: Jamilah has had no previous cardio, labs testing etc.     Callback Number: [phone]    Thank you,   Adelina DAVE

## 2023-06-12 ENCOUNTER — OFFICE VISIT (OUTPATIENT)
Dept: CARDIOLOGY | Facility: MEDICAL CENTER | Age: 53
End: 2023-06-12
Attending: FAMILY MEDICINE
Payer: COMMERCIAL

## 2023-06-12 VITALS
SYSTOLIC BLOOD PRESSURE: 122 MMHG | HEART RATE: 64 BPM | OXYGEN SATURATION: 96 % | BODY MASS INDEX: 26.63 KG/M2 | DIASTOLIC BLOOD PRESSURE: 80 MMHG | RESPIRATION RATE: 12 BRPM | WEIGHT: 156 LBS | HEIGHT: 64 IN

## 2023-06-12 DIAGNOSIS — R06.02 SHORTNESS OF BREATH: ICD-10-CM

## 2023-06-12 DIAGNOSIS — I35.8 AORTIC VALVE SCLEROSIS: ICD-10-CM

## 2023-06-12 DIAGNOSIS — E78.5 DYSLIPIDEMIA: ICD-10-CM

## 2023-06-12 DIAGNOSIS — R00.2 PALPITATIONS: ICD-10-CM

## 2023-06-12 LAB — EKG IMPRESSION: NORMAL

## 2023-06-12 PROCEDURE — 93005 ELECTROCARDIOGRAM TRACING: CPT | Performed by: INTERNAL MEDICINE

## 2023-06-12 PROCEDURE — 99204 OFFICE O/P NEW MOD 45 MIN: CPT | Mod: 25 | Performed by: INTERNAL MEDICINE

## 2023-06-12 PROCEDURE — 93010 ELECTROCARDIOGRAM REPORT: CPT | Performed by: INTERNAL MEDICINE

## 2023-06-12 PROCEDURE — 3079F DIAST BP 80-89 MM HG: CPT | Performed by: INTERNAL MEDICINE

## 2023-06-12 PROCEDURE — 99211 OFF/OP EST MAY X REQ PHY/QHP: CPT | Performed by: INTERNAL MEDICINE

## 2023-06-12 PROCEDURE — 3074F SYST BP LT 130 MM HG: CPT | Performed by: INTERNAL MEDICINE

## 2023-06-12 ASSESSMENT — FIBROSIS 4 INDEX: FIB4 SCORE: 1.14

## 2023-06-13 ENCOUNTER — HOSPITAL ENCOUNTER (OUTPATIENT)
Dept: CARDIOLOGY | Facility: MEDICAL CENTER | Age: 53
End: 2023-06-13
Attending: INTERNAL MEDICINE
Payer: COMMERCIAL

## 2023-06-13 DIAGNOSIS — R06.02 SHORTNESS OF BREATH: ICD-10-CM

## 2023-06-13 DIAGNOSIS — I35.8 AORTIC VALVE SCLEROSIS: ICD-10-CM

## 2023-06-13 DIAGNOSIS — R00.2 PALPITATIONS: ICD-10-CM

## 2023-06-13 LAB
LV EJECT FRACT  99904: 55
LV EJECT FRACT MOD 2C 99903: 62.09
LV EJECT FRACT MOD 4C 99902: 57.15
LV EJECT FRACT MOD BP 99901: 59.25

## 2023-06-13 PROCEDURE — 93306 TTE W/DOPPLER COMPLETE: CPT | Mod: 26 | Performed by: INTERNAL MEDICINE

## 2023-06-13 PROCEDURE — 93306 TTE W/DOPPLER COMPLETE: CPT

## 2023-06-26 NOTE — PROGRESS NOTES
Home enrollment completed in the 14 day Zio XT Holter monitoring program, per Giovanni Zuluaga MD.  Monitor will be shipped to patient via [employer]. Pending EOS.

## 2023-06-30 ENCOUNTER — OFFICE VISIT (OUTPATIENT)
Dept: MEDICAL GROUP | Facility: PHYSICIAN GROUP | Age: 53
End: 2023-06-30
Payer: COMMERCIAL

## 2023-06-30 VITALS
HEIGHT: 64 IN | SYSTOLIC BLOOD PRESSURE: 112 MMHG | BODY MASS INDEX: 27.93 KG/M2 | TEMPERATURE: 97.7 F | HEART RATE: 72 BPM | WEIGHT: 163.6 LBS | DIASTOLIC BLOOD PRESSURE: 70 MMHG | OXYGEN SATURATION: 94 %

## 2023-06-30 DIAGNOSIS — G56.01 RIGHT CARPAL TUNNEL SYNDROME: ICD-10-CM

## 2023-06-30 DIAGNOSIS — G56.03 CARPAL TUNNEL SYNDROME ON BOTH SIDES: ICD-10-CM

## 2023-06-30 DIAGNOSIS — Z01.84 IMMUNITY STATUS TESTING: ICD-10-CM

## 2023-06-30 DIAGNOSIS — G56.02 LEFT CARPAL TUNNEL SYNDROME: ICD-10-CM

## 2023-06-30 DIAGNOSIS — Z12.31 ENCOUNTER FOR SCREENING MAMMOGRAM FOR MALIGNANT NEOPLASM OF BREAST: ICD-10-CM

## 2023-06-30 DIAGNOSIS — Z78.0 POSTMENOPAUSAL: ICD-10-CM

## 2023-06-30 DIAGNOSIS — Z00.00 WELL WOMAN EXAM (NO GYNECOLOGICAL EXAM): ICD-10-CM

## 2023-06-30 PROCEDURE — 99396 PREV VISIT EST AGE 40-64: CPT | Performed by: FAMILY MEDICINE

## 2023-06-30 PROCEDURE — 3074F SYST BP LT 130 MM HG: CPT | Performed by: FAMILY MEDICINE

## 2023-06-30 PROCEDURE — 3078F DIAST BP <80 MM HG: CPT | Performed by: FAMILY MEDICINE

## 2023-06-30 RX ORDER — ESTRADIOL 0.1 MG/G
CREAM VAGINAL
Qty: 42.5 G | Refills: 3 | Status: SHIPPED | OUTPATIENT
Start: 2023-06-30

## 2023-06-30 ASSESSMENT — FIBROSIS 4 INDEX: FIB4 SCORE: 1.14

## 2023-06-30 ASSESSMENT — PATIENT HEALTH QUESTIONNAIRE - PHQ9: CLINICAL INTERPRETATION OF PHQ2 SCORE: 0

## 2023-06-30 NOTE — PROGRESS NOTES
Subjective:     CC:   Chief Complaint   Patient presents with    Annual Exam    Medication Refill       HPI:   Jamilah Martinez is a 53 y.o. female who presents for annual exam. She is feeling well and denies any complaints.    Ob-Gyn/ History:    Patient has GYN provider: yes  Last Pap Smear:  1 year ago. no history of abnormal pap smears.  Gyn Surgery:  hysterectomy in 2012.    Health Maintenance  Diet: trying to eat better   Exercise: keeping active   Substance Abuse: none   Safe in relationship.   Seat belts, bike helmet, gun safety discussed.  Sun protection used.    Cancer screening  Colorectal Cancer Screening: UTD      Breast Cancer Screening: due     Infectious disease screening/Immunizations    --Immunizations:    UTD      She  has a past medical history of Carpal tunnel syndrome, Depression (5/25/2021), GERD (gastroesophageal reflux disease), Heart burn, Indigestion, and Pure hypercholesterolemia (8/23/2016).    She has no past medical history of Arrhythmia, Asthma, Blood transfusion without reported diagnosis, CHF (congestive heart failure) (HCC), COPD (chronic obstructive pulmonary disease) (HCC), Diabetes (HCC), Heart attack (HCC), Heart murmur, Kidney disease, Seizure (HCC), Stroke (HCC), Thyroid disease, Tuberculosis, or Ulcer.  She  has a past surgical history that includes hysterectomy, vaginal (3/2015); cholecystectomy; pelviscopy; pr anter colporrhaphy,blad/vagina (N/A, 12/3/2021); pr sling oper stres incontinence (N/A, 12/3/2021); and pr cystourethroscopy (N/A, 12/3/2021).    Family History   Problem Relation Age of Onset    Heart Disease Mother         CHF/MIx2    Hyperlipidemia Father     Cancer Sister         BR CA    Heart Disease Maternal Grandmother         pacemaker=placed very late in life     Other Maternal Grandfather         parkinson    Stroke Maternal Grandfather     Cancer Paternal Grandfather         bone CA       Social History     Socioeconomic History    Marital  status:      Spouse name: Not on file    Number of children: Not on file    Years of education: Not on file    Highest education level: Not on file   Occupational History    Not on file   Tobacco Use    Smoking status: Never    Smokeless tobacco: Never   Vaping Use    Vaping Use: Never used   Substance and Sexual Activity    Alcohol use: No     Alcohol/week: 0.0 oz    Drug use: No    Sexual activity: Never     Partners: Male     Comment:  x 28 years   Other Topics Concern    Not on file   Social History Narrative    Not on file     Social Determinants of Health     Financial Resource Strain: Not on file   Food Insecurity: Not on file   Transportation Needs: Not on file   Physical Activity: Not on file   Stress: Not on file   Social Connections: Not on file   Intimate Partner Violence: Not on file   Housing Stability: Not on file       Patient Active Problem List    Diagnosis Date Noted    Shortness of breath 06/12/2023    Aortic valve sclerosis 06/12/2023    Palpitations 06/23/2022    Anxiety 06/23/2022    Sinus pressure 02/17/2022    Golfer's elbow, unspecified laterality 02/17/2022    Bladder prolapse, female, acquired 09/03/2021    Depression 05/25/2021    Gastroesophageal reflux disease 11/20/2018    Oropharyngeal dysphagia 11/20/2018    Allergic rhinitis 09/26/2016    Pure hypercholesterolemia 08/23/2016    Carpal tunnel syndrome     Leukopenia 06/23/2015         Current Outpatient Medications   Medication Sig Dispense Refill    estradiol (ESTRACE) 0.1 MG/GM vaginal cream Apply 0.5g intravaginally 3x weekly 42.5 g 3    sertraline (ZOLOFT) 50 MG Tab Take 1 Tablet by mouth every day. 90 Tablet 1     No current facility-administered medications for this visit.     No Known Allergies    Review of Systems   Constitutional: Negative for fever, chills and malaise/fatigue.   HENT: Negative for congestion.    Eyes: Negative for pain.   Respiratory: Negative for cough and shortness of breath.   "  Cardiovascular: Negative for leg swelling.   Gastrointestinal: Negative for nausea, vomiting, abdominal pain and diarrhea.   Genitourinary: Negative for dysuria and hematuria.   Skin: Negative for rash.   Neurological: Negative for dizziness, focal weakness and headaches.   Endo/Heme/Allergies: Does not bruise/bleed easily.   Psychiatric/Behavioral: Negative for depression.  The patient is not nervous/anxious.      Objective:     /70 (BP Location: Left arm, Patient Position: Sitting, BP Cuff Size: Adult)   Pulse 72   Temp 36.5 °C (97.7 °F) (Temporal)   Ht 1.626 m (5' 4\")   Wt 74.2 kg (163 lb 9.6 oz)   LMP 04/25/2014   SpO2 94%   BMI 28.08 kg/m²   Body mass index is 28.08 kg/m².  Wt Readings from Last 4 Encounters:   06/30/23 74.2 kg (163 lb 9.6 oz)   06/12/23 70.8 kg (156 lb)   06/23/22 69.9 kg (154 lb)   06/06/22 71.7 kg (158 lb)       Physical Exam:  Constitutional: Well-developed and well-nourished. Not diaphoretic. No distress.   Skin: Skin is warm and dry. No rash noted.  Head: Atraumatic without lesions.  Eyes: Conjunctivae and extraocular motions are normal. Pupils are equal, round, and reactive to light. No scleral icterus.   Ears:  External ears unremarkable. Tympanic membranes clear and intact.  Nose: Nares patent. Septum midline. Turbinates without erythema nor edema. No discharge.   Mouth/Throat: Dentition is normal. Tongue normal. Oropharynx is clear and moist. Posterior pharynx without erythema or exudates.  Neck: Supple, trachea midline. Normal range of motion. No thyromegaly present. No lymphadenopathy--cervical or supraclavicular.  Cardiovascular: Regular rate and rhythm, S1 and S2 without murmur, rubs, or gallops.  Lungs: Normal inspiratory effort, CTA bilaterally, no wheezes/rhonchi/rales  Abdomen: Soft, non tender, and without distention. Active bowel sounds in all four quadrants. No rebound, guarding, masses or HSM.  Extremities: No cyanosis, clubbing, erythema, nor edema. Distal " pulses intact and symmetric.   Musculoskeletal: All major joints AROM full in all directions without pain.  Neurological: Alert and oriented x 3.   Psychiatric:  Behavior, mood, and affect are appropriate.      Assessment and Plan:     1. Carpal tunnel syndrome on both sides  estradiol (ESTRACE) 0.1 MG/GM vaginal cream      2. Right carpal tunnel syndrome  estradiol (ESTRACE) 0.1 MG/GM vaginal cream      3. Left carpal tunnel syndrome  estradiol (ESTRACE) 0.1 MG/GM vaginal cream      4. Encounter for screening mammogram for malignant neoplasm of breast  MA-SCREENING MAMMO BILAT W/TOMOSYNTHESIS W/CAD      5. Immunity status testing  HEP B SURFACE AB    HEP B CORE AB TOTAL    HEP B SURFACE ANTIGEN      6. Well woman exam (no gynecological exam)  CBC WITHOUT DIFFERENTIAL    Comp Metabolic Panel    Lipid Profile    HEMOGLOBIN A1C          HCM:  Completed   Labs per orders  Patient counseled about skin care, diet, supplements, prenatal vitamins, safe sex and exercise.    Follow-up: No follow-ups on file.

## 2023-07-03 ENCOUNTER — NON-PROVIDER VISIT (OUTPATIENT)
Dept: CARDIOLOGY | Facility: MEDICAL CENTER | Age: 53
End: 2023-07-03
Attending: INTERNAL MEDICINE
Payer: COMMERCIAL

## 2023-07-03 DIAGNOSIS — I49.3 PVC'S (PREMATURE VENTRICULAR CONTRACTIONS): ICD-10-CM

## 2023-07-03 DIAGNOSIS — I49.1 APC (ATRIAL PREMATURE CONTRACTIONS): ICD-10-CM

## 2023-07-03 DIAGNOSIS — R00.2 PALPITATIONS: ICD-10-CM

## 2023-07-03 DIAGNOSIS — R06.02 SHORTNESS OF BREATH: ICD-10-CM

## 2023-07-12 ENCOUNTER — HOSPITAL ENCOUNTER (OUTPATIENT)
Dept: LAB | Facility: MEDICAL CENTER | Age: 53
End: 2023-07-12
Attending: FAMILY MEDICINE
Payer: COMMERCIAL

## 2023-07-12 DIAGNOSIS — Z00.00 WELL WOMAN EXAM (NO GYNECOLOGICAL EXAM): ICD-10-CM

## 2023-07-12 DIAGNOSIS — Z01.84 IMMUNITY STATUS TESTING: ICD-10-CM

## 2023-07-12 LAB
ALBUMIN SERPL BCP-MCNC: 4.3 G/DL (ref 3.2–4.9)
ALBUMIN/GLOB SERPL: 1.7 G/DL
ALP SERPL-CCNC: 60 U/L (ref 30–99)
ALT SERPL-CCNC: 16 U/L (ref 2–50)
ANION GAP SERPL CALC-SCNC: 11 MMOL/L (ref 7–16)
AST SERPL-CCNC: 20 U/L (ref 12–45)
BILIRUB SERPL-MCNC: 0.4 MG/DL (ref 0.1–1.5)
BUN SERPL-MCNC: 13 MG/DL (ref 8–22)
CALCIUM ALBUM COR SERPL-MCNC: 9.6 MG/DL (ref 8.5–10.5)
CALCIUM SERPL-MCNC: 9.8 MG/DL (ref 8.5–10.5)
CHLORIDE SERPL-SCNC: 103 MMOL/L (ref 96–112)
CHOLEST SERPL-MCNC: 248 MG/DL (ref 100–199)
CO2 SERPL-SCNC: 25 MMOL/L (ref 20–33)
CREAT SERPL-MCNC: 0.81 MG/DL (ref 0.5–1.4)
ERYTHROCYTE [DISTWIDTH] IN BLOOD BY AUTOMATED COUNT: 42.1 FL (ref 35.9–50)
EST. AVERAGE GLUCOSE BLD GHB EST-MCNC: 114 MG/DL
FASTING STATUS PATIENT QL REPORTED: NORMAL
GFR SERPLBLD CREATININE-BSD FMLA CKD-EPI: 86 ML/MIN/1.73 M 2
GLOBULIN SER CALC-MCNC: 2.6 G/DL (ref 1.9–3.5)
GLUCOSE SERPL-MCNC: 89 MG/DL (ref 65–99)
HBA1C MFR BLD: 5.6 % (ref 4–5.6)
HBV CORE AB SERPL QL IA: NONREACTIVE
HBV SURFACE AB SERPL IA-ACNC: <3.5 MIU/ML (ref 0–10)
HBV SURFACE AG SER QL: NORMAL
HCT VFR BLD AUTO: 42.4 % (ref 37–47)
HDLC SERPL-MCNC: 50 MG/DL
HGB BLD-MCNC: 13.5 G/DL (ref 12–16)
LDLC SERPL CALC-MCNC: 180 MG/DL
MCH RBC QN AUTO: 28.5 PG (ref 27–33)
MCHC RBC AUTO-ENTMCNC: 31.8 G/DL (ref 32.2–35.5)
MCV RBC AUTO: 89.5 FL (ref 81.4–97.8)
PLATELET # BLD AUTO: 154 K/UL (ref 164–446)
PMV BLD AUTO: 11.8 FL (ref 9–12.9)
POTASSIUM SERPL-SCNC: 4.1 MMOL/L (ref 3.6–5.5)
PROT SERPL-MCNC: 6.9 G/DL (ref 6–8.2)
RBC # BLD AUTO: 4.74 M/UL (ref 4.2–5.4)
SODIUM SERPL-SCNC: 139 MMOL/L (ref 135–145)
TRIGL SERPL-MCNC: 91 MG/DL (ref 0–149)
WBC # BLD AUTO: 2.5 K/UL (ref 4.8–10.8)

## 2023-07-12 PROCEDURE — 80061 LIPID PANEL: CPT

## 2023-07-12 PROCEDURE — 87340 HEPATITIS B SURFACE AG IA: CPT

## 2023-07-12 PROCEDURE — 80053 COMPREHEN METABOLIC PANEL: CPT

## 2023-07-12 PROCEDURE — 36415 COLL VENOUS BLD VENIPUNCTURE: CPT

## 2023-07-12 PROCEDURE — 86706 HEP B SURFACE ANTIBODY: CPT

## 2023-07-12 PROCEDURE — 83036 HEMOGLOBIN GLYCOSYLATED A1C: CPT

## 2023-07-12 PROCEDURE — 86704 HEP B CORE ANTIBODY TOTAL: CPT

## 2023-07-12 PROCEDURE — 85027 COMPLETE CBC AUTOMATED: CPT

## 2023-07-14 ENCOUNTER — HOSPITAL ENCOUNTER (OUTPATIENT)
Dept: RADIOLOGY | Facility: MEDICAL CENTER | Age: 53
End: 2023-07-14
Attending: FAMILY MEDICINE
Payer: COMMERCIAL

## 2023-07-14 DIAGNOSIS — Z12.31 ENCOUNTER FOR SCREENING MAMMOGRAM FOR MALIGNANT NEOPLASM OF BREAST: ICD-10-CM

## 2023-07-14 PROCEDURE — 77063 BREAST TOMOSYNTHESIS BI: CPT

## 2023-07-17 ENCOUNTER — TELEPHONE (OUTPATIENT)
Dept: CARDIOLOGY | Facility: MEDICAL CENTER | Age: 53
End: 2023-07-17

## 2023-07-17 ENCOUNTER — APPOINTMENT (OUTPATIENT)
Dept: MEDICAL GROUP | Facility: PHYSICIAN GROUP | Age: 53
End: 2023-07-17
Payer: COMMERCIAL

## 2023-07-17 PROCEDURE — 93248 EXT ECG>7D<15D REV&INTERPJ: CPT | Performed by: INTERNAL MEDICINE

## 2023-07-17 SDOH — HEALTH STABILITY: MENTAL HEALTH
STRESS IS WHEN SOMEONE FEELS TENSE, NERVOUS, ANXIOUS, OR CAN'T SLEEP AT NIGHT BECAUSE THEIR MIND IS TROUBLED. HOW STRESSED ARE YOU?: NOT AT ALL

## 2023-07-17 SDOH — ECONOMIC STABILITY: HOUSING INSECURITY
IN THE LAST 12 MONTHS, WAS THERE A TIME WHEN YOU DID NOT HAVE A STEADY PLACE TO SLEEP OR SLEPT IN A SHELTER (INCLUDING NOW)?: NO

## 2023-07-17 SDOH — ECONOMIC STABILITY: FOOD INSECURITY: WITHIN THE PAST 12 MONTHS, YOU WORRIED THAT YOUR FOOD WOULD RUN OUT BEFORE YOU GOT MONEY TO BUY MORE.: PATIENT DECLINED

## 2023-07-17 SDOH — ECONOMIC STABILITY: INCOME INSECURITY: IN THE LAST 12 MONTHS, WAS THERE A TIME WHEN YOU WERE NOT ABLE TO PAY THE MORTGAGE OR RENT ON TIME?: NO

## 2023-07-17 SDOH — ECONOMIC STABILITY: INCOME INSECURITY: HOW HARD IS IT FOR YOU TO PAY FOR THE VERY BASICS LIKE FOOD, HOUSING, MEDICAL CARE, AND HEATING?: PATIENT DECLINED

## 2023-07-17 SDOH — ECONOMIC STABILITY: FOOD INSECURITY: WITHIN THE PAST 12 MONTHS, THE FOOD YOU BOUGHT JUST DIDN'T LAST AND YOU DIDN'T HAVE MONEY TO GET MORE.: PATIENT DECLINED

## 2023-07-17 SDOH — HEALTH STABILITY: PHYSICAL HEALTH: ON AVERAGE, HOW MANY DAYS PER WEEK DO YOU ENGAGE IN MODERATE TO STRENUOUS EXERCISE (LIKE A BRISK WALK)?: 6 DAYS

## 2023-07-17 SDOH — ECONOMIC STABILITY: TRANSPORTATION INSECURITY
IN THE PAST 12 MONTHS, HAS LACK OF RELIABLE TRANSPORTATION KEPT YOU FROM MEDICAL APPOINTMENTS, MEETINGS, WORK OR FROM GETTING THINGS NEEDED FOR DAILY LIVING?: NO

## 2023-07-17 SDOH — ECONOMIC STABILITY: HOUSING INSECURITY: IN THE LAST 12 MONTHS, HOW MANY PLACES HAVE YOU LIVED?: 1

## 2023-07-17 SDOH — ECONOMIC STABILITY: TRANSPORTATION INSECURITY
IN THE PAST 12 MONTHS, HAS LACK OF TRANSPORTATION KEPT YOU FROM MEETINGS, WORK, OR FROM GETTING THINGS NEEDED FOR DAILY LIVING?: NO

## 2023-07-17 SDOH — ECONOMIC STABILITY: TRANSPORTATION INSECURITY
IN THE PAST 12 MONTHS, HAS THE LACK OF TRANSPORTATION KEPT YOU FROM MEDICAL APPOINTMENTS OR FROM GETTING MEDICATIONS?: NO

## 2023-07-17 SDOH — HEALTH STABILITY: PHYSICAL HEALTH: ON AVERAGE, HOW MANY MINUTES DO YOU ENGAGE IN EXERCISE AT THIS LEVEL?: 60 MIN

## 2023-07-17 ASSESSMENT — SOCIAL DETERMINANTS OF HEALTH (SDOH)
HOW MANY DRINKS CONTAINING ALCOHOL DO YOU HAVE ON A TYPICAL DAY WHEN YOU ARE DRINKING: PATIENT DOES NOT DRINK
HOW OFTEN DO YOU GET TOGETHER WITH FRIENDS OR RELATIVES?: MORE THAN THREE TIMES A WEEK
HOW OFTEN DO YOU HAVE A DRINK CONTAINING ALCOHOL: NEVER
HOW OFTEN DO YOU HAVE SIX OR MORE DRINKS ON ONE OCCASION: NEVER
WITHIN THE PAST 12 MONTHS, YOU WORRIED THAT YOUR FOOD WOULD RUN OUT BEFORE YOU GOT THE MONEY TO BUY MORE: PATIENT DECLINED
HOW OFTEN DO YOU GET TOGETHER WITH FRIENDS OR RELATIVES?: MORE THAN THREE TIMES A WEEK
HOW OFTEN DO YOU ATTENT MEETINGS OF THE CLUB OR ORGANIZATION YOU BELONG TO?: PATIENT DECLINED
HOW OFTEN DO YOU ATTENT MEETINGS OF THE CLUB OR ORGANIZATION YOU BELONG TO?: PATIENT DECLINED
HOW OFTEN DO YOU ATTEND CHURCH OR RELIGIOUS SERVICES?: 1 TO 4 TIMES PER YEAR
HOW OFTEN DO YOU ATTEND CHURCH OR RELIGIOUS SERVICES?: 1 TO 4 TIMES PER YEAR
IN A TYPICAL WEEK, HOW MANY TIMES DO YOU TALK ON THE PHONE WITH FAMILY, FRIENDS, OR NEIGHBORS?: MORE THAN THREE TIMES A WEEK
DO YOU BELONG TO ANY CLUBS OR ORGANIZATIONS SUCH AS CHURCH GROUPS UNIONS, FRATERNAL OR ATHLETIC GROUPS, OR SCHOOL GROUPS?: NO
DO YOU BELONG TO ANY CLUBS OR ORGANIZATIONS SUCH AS CHURCH GROUPS UNIONS, FRATERNAL OR ATHLETIC GROUPS, OR SCHOOL GROUPS?: NO
IN A TYPICAL WEEK, HOW MANY TIMES DO YOU TALK ON THE PHONE WITH FAMILY, FRIENDS, OR NEIGHBORS?: MORE THAN THREE TIMES A WEEK
HOW HARD IS IT FOR YOU TO PAY FOR THE VERY BASICS LIKE FOOD, HOUSING, MEDICAL CARE, AND HEATING?: PATIENT DECLINED

## 2023-07-17 ASSESSMENT — LIFESTYLE VARIABLES
HOW MANY STANDARD DRINKS CONTAINING ALCOHOL DO YOU HAVE ON A TYPICAL DAY: PATIENT DOES NOT DRINK
AUDIT-C TOTAL SCORE: 0
HOW OFTEN DO YOU HAVE SIX OR MORE DRINKS ON ONE OCCASION: NEVER
SKIP TO QUESTIONS 9-10: 1
HOW OFTEN DO YOU HAVE A DRINK CONTAINING ALCOHOL: NEVER

## 2023-07-17 NOTE — TELEPHONE ENCOUNTER
Clyde JARVIS routed to MK    To MK: please review and advise pt if any further recommendations. Thank you!

## 2023-07-18 ENCOUNTER — TELEPHONE (OUTPATIENT)
Dept: CARDIOLOGY | Facility: MEDICAL CENTER | Age: 53
End: 2023-07-18

## 2023-07-18 ENCOUNTER — OFFICE VISIT (OUTPATIENT)
Dept: MEDICAL GROUP | Facility: PHYSICIAN GROUP | Age: 53
End: 2023-07-18
Payer: COMMERCIAL

## 2023-07-18 ENCOUNTER — HOSPITAL ENCOUNTER (OUTPATIENT)
Dept: RADIOLOGY | Facility: MEDICAL CENTER | Age: 53
End: 2023-07-18
Attending: FAMILY MEDICINE
Payer: COMMERCIAL

## 2023-07-18 VITALS
OXYGEN SATURATION: 95 % | HEIGHT: 64 IN | BODY MASS INDEX: 27.25 KG/M2 | HEART RATE: 66 BPM | WEIGHT: 159.6 LBS | SYSTOLIC BLOOD PRESSURE: 102 MMHG | TEMPERATURE: 97.3 F | DIASTOLIC BLOOD PRESSURE: 64 MMHG

## 2023-07-18 DIAGNOSIS — S69.91XA INJURY OF FINGER OF RIGHT HAND, INITIAL ENCOUNTER: ICD-10-CM

## 2023-07-18 DIAGNOSIS — R21 RASH: ICD-10-CM

## 2023-07-18 DIAGNOSIS — E78.00 PURE HYPERCHOLESTEROLEMIA: ICD-10-CM

## 2023-07-18 PROCEDURE — 99214 OFFICE O/P EST MOD 30 MIN: CPT | Performed by: FAMILY MEDICINE

## 2023-07-18 PROCEDURE — 73140 X-RAY EXAM OF FINGER(S): CPT | Mod: RT

## 2023-07-18 PROCEDURE — 3078F DIAST BP <80 MM HG: CPT | Performed by: FAMILY MEDICINE

## 2023-07-18 PROCEDURE — 3074F SYST BP LT 130 MM HG: CPT | Performed by: FAMILY MEDICINE

## 2023-07-18 RX ORDER — TRIAMCINOLONE ACETONIDE 1 MG/G
CREAM TOPICAL
Qty: 80 G | Refills: 1 | Status: SHIPPED | OUTPATIENT
Start: 2023-07-18

## 2023-07-18 ASSESSMENT — FIBROSIS 4 INDEX: FIB4 SCORE: 1.785714285714285714

## 2023-07-18 NOTE — PROGRESS NOTES
"Subjective:     Chief Complaint   Patient presents with    Rash     Neck 2x weeks     Finger Pain     Jammed 5x days ago        HPI:   Jamilah presents today to discuss the following.    Injury of finger of right hand  New issue  Had blunt trauma to right 3rd digit to DIP joint.  Now has swan neck deformity    Still has swelling and some pain    Rash  New issue  Has had neck rash for 3 weeks  Has tried antihistamine ointment    Pure hypercholesterolemia  Chronic issue  Elevated  The 10-year ASCVD risk score (Cholo HUSSEIN, et al., 2019) is: 1.8%      Past Medical History:   Diagnosis Date    Carpal tunnel syndrome     Depression 5/25/2021    GERD (gastroesophageal reflux disease)     at times with greasy food, tums or zantac,    Heart burn     Indigestion     Pure hypercholesterolemia 8/23/2016       Current Outpatient Medications Ordered in Epic   Medication Sig Dispense Refill    triamcinolone acetonide (KENALOG) 0.1 % Cream Apply to neck twice daily 80 g 1    estradiol (ESTRACE) 0.1 MG/GM vaginal cream Apply 0.5g intravaginally 3x weekly 42.5 g 3    sertraline (ZOLOFT) 50 MG Tab Take 1 Tablet by mouth every day. 90 Tablet 1     No current Epic-ordered facility-administered medications on file.       Allergies:  Patient has no known allergies.    Health Maintenance: Completed    ROS:  Gen: no fevers/chills, no changes in weight  Eyes: no changes in vision  Pulm: no sob, no cough  CV: no chest pain, no palpitations  GI: no nausea/vomiting, no diarrhea      Objective:     Exam:  /64 (BP Location: Left arm, Patient Position: Sitting, BP Cuff Size: Adult)   Pulse 66   Temp 36.3 °C (97.3 °F) (Temporal)   Ht 1.626 m (5' 4\")   Wt 72.4 kg (159 lb 9.6 oz)   LMP 04/25/2014   SpO2 95%   BMI 27.40 kg/m²  Body mass index is 27.4 kg/m².    Constitutional: Alert, no distress, well-groomed.  Skin: Warm, dry, good turgor, rash localized to the neck region.  Eye: Equal, round and reactive, conjunctiva clear, lids " normal.  ENMT: Lips without lesions, good dentition, moist mucous membranes.  Neck: Trachea midline, no masses, no thyromegaly.  Respiratory: Unlabored respiratory effort, no cough.  MSK: Normal gait, moves all extremities.  Inspection of the right middle finger shows evidence of mild swelling and bruising localized to the PIP joint.  There is also swan-neck deformity.  Neuro: Grossly non-focal.   Psych: Alert and oriented x3, normal affect and mood.        Assessment & Plan:     53 y.o. female with the following -     1. Injury of finger of right hand, initial encounter  New issue.  Presents with swan-neck deformity.  I recommend splinting to keep the finger extended as much as possible.  We will proceed with x-rays to rule out fracture.  Referral to a hand specialist today.  - DX-FINGER(S) 2+ RIGHT; Future  - Referral to Hand Surgery    2. Rash  New problem.  Likely atopic dermatitis.  Will treat with triamcinolone cream.  - triamcinolone acetonide (KENALOG) 0.1 % Cream; Apply to neck twice daily  Dispense: 80 g; Refill: 1    3. Pure hypercholesterolemia  Chronic, worsening condition.  Recommend reinforcing lifestyle change with weight loss and exercise and healthier dietary choices.      No follow-ups on file.          Please note that this dictation was created using voice recognition software. I have made every reasonable attempt to correct obvious errors, but I expect that there are errors of grammar and possibly content that I did not discover before finalizing the note.

## 2023-07-18 NOTE — ASSESSMENT & PLAN NOTE
New issue  Had blunt trauma to right 3rd digit to DIP joint.  Now has swan neck deformity    Still has swelling and some pain

## 2023-07-19 NOTE — TELEPHONE ENCOUNTER
Phone Number Called: [phone]    Call outcome: Spoke to patient regarding message below.    Message:     Called and spoke to pt and relayed MK's results/recs for her. Pt very appreciative of call.    ----- Message from Giovanni MATA M.D. sent at 7/17/2023  4:20 PM PDT -----  Cardiac monitoring results reviewed.  Please let the patient know that the monitor did not show any sustained arrhythmias.  Only found to have rare PVCs and rare PACs.  Recommend conservative management PVCs given burden of < 1.0%.  No changes in plan of care at this time.    MELY

## 2023-08-14 ENCOUNTER — TELEPHONE (OUTPATIENT)
Dept: HEALTH INFORMATION MANAGEMENT | Facility: OTHER | Age: 53
End: 2023-08-14

## 2024-07-17 ENCOUNTER — HOSPITAL ENCOUNTER (OUTPATIENT)
Dept: RADIOLOGY | Facility: MEDICAL CENTER | Age: 54
End: 2024-07-17
Attending: FAMILY MEDICINE
Payer: COMMERCIAL

## 2024-07-17 DIAGNOSIS — Z12.31 VISIT FOR SCREENING MAMMOGRAM: ICD-10-CM

## 2024-07-17 PROCEDURE — 77063 BREAST TOMOSYNTHESIS BI: CPT

## 2024-08-21 ENCOUNTER — OFFICE VISIT (OUTPATIENT)
Dept: MEDICAL GROUP | Age: 54
End: 2024-08-21
Payer: COMMERCIAL

## 2024-08-21 VITALS
SYSTOLIC BLOOD PRESSURE: 104 MMHG | WEIGHT: 143 LBS | BODY MASS INDEX: 24.41 KG/M2 | HEIGHT: 64 IN | HEART RATE: 70 BPM | DIASTOLIC BLOOD PRESSURE: 64 MMHG | OXYGEN SATURATION: 99 % | TEMPERATURE: 96.7 F

## 2024-08-21 DIAGNOSIS — Z78.0 POSTMENOPAUSAL: ICD-10-CM

## 2024-08-21 DIAGNOSIS — E78.2 MIXED DYSLIPIDEMIA: ICD-10-CM

## 2024-08-21 DIAGNOSIS — F41.9 ANXIETY: ICD-10-CM

## 2024-08-21 DIAGNOSIS — R73.03 PREDIABETES: ICD-10-CM

## 2024-08-21 PROBLEM — R21 RASH: Status: RESOLVED | Noted: 2023-07-18 | Resolved: 2024-08-21

## 2024-08-21 PROBLEM — S69.91XA INJURY OF FINGER OF RIGHT HAND: Status: RESOLVED | Noted: 2023-07-18 | Resolved: 2024-08-21

## 2024-08-21 PROBLEM — R06.02 SHORTNESS OF BREATH: Status: RESOLVED | Noted: 2023-06-12 | Resolved: 2024-08-21

## 2024-08-21 PROCEDURE — 3078F DIAST BP <80 MM HG: CPT | Performed by: STUDENT IN AN ORGANIZED HEALTH CARE EDUCATION/TRAINING PROGRAM

## 2024-08-21 PROCEDURE — 99214 OFFICE O/P EST MOD 30 MIN: CPT | Performed by: STUDENT IN AN ORGANIZED HEALTH CARE EDUCATION/TRAINING PROGRAM

## 2024-08-21 PROCEDURE — 3074F SYST BP LT 130 MM HG: CPT | Performed by: STUDENT IN AN ORGANIZED HEALTH CARE EDUCATION/TRAINING PROGRAM

## 2024-08-21 RX ORDER — ESTRADIOL 0.1 MG/G
CREAM VAGINAL
Qty: 42.5 G | Refills: 3 | Status: SHIPPED | OUTPATIENT
Start: 2024-08-21

## 2024-08-21 SDOH — ECONOMIC STABILITY: FOOD INSECURITY: WITHIN THE PAST 12 MONTHS, THE FOOD YOU BOUGHT JUST DIDN'T LAST AND YOU DIDN'T HAVE MONEY TO GET MORE.: NEVER TRUE

## 2024-08-21 SDOH — ECONOMIC STABILITY: INCOME INSECURITY: HOW HARD IS IT FOR YOU TO PAY FOR THE VERY BASICS LIKE FOOD, HOUSING, MEDICAL CARE, AND HEATING?: NOT HARD AT ALL

## 2024-08-21 SDOH — ECONOMIC STABILITY: FOOD INSECURITY: WITHIN THE PAST 12 MONTHS, YOU WORRIED THAT YOUR FOOD WOULD RUN OUT BEFORE YOU GOT MONEY TO BUY MORE.: NEVER TRUE

## 2024-08-21 SDOH — HEALTH STABILITY: PHYSICAL HEALTH: ON AVERAGE, HOW MANY MINUTES DO YOU ENGAGE IN EXERCISE AT THIS LEVEL?: 40 MIN

## 2024-08-21 SDOH — HEALTH STABILITY: PHYSICAL HEALTH: ON AVERAGE, HOW MANY DAYS PER WEEK DO YOU ENGAGE IN MODERATE TO STRENUOUS EXERCISE (LIKE A BRISK WALK)?: 4 DAYS

## 2024-08-21 SDOH — ECONOMIC STABILITY: INCOME INSECURITY: IN THE LAST 12 MONTHS, WAS THERE A TIME WHEN YOU WERE NOT ABLE TO PAY THE MORTGAGE OR RENT ON TIME?: NO

## 2024-08-21 ASSESSMENT — LIFESTYLE VARIABLES
HOW OFTEN DO YOU HAVE SIX OR MORE DRINKS ON ONE OCCASION: NEVER
HOW MANY STANDARD DRINKS CONTAINING ALCOHOL DO YOU HAVE ON A TYPICAL DAY: 1 OR 2
SKIP TO QUESTIONS 9-10: 1
AUDIT-C TOTAL SCORE: 0
HOW OFTEN DO YOU HAVE A DRINK CONTAINING ALCOHOL: NEVER

## 2024-08-21 ASSESSMENT — SOCIAL DETERMINANTS OF HEALTH (SDOH)
HOW OFTEN DO YOU ATTENT MEETINGS OF THE CLUB OR ORGANIZATION YOU BELONG TO?: NEVER
HOW OFTEN DO YOU ATTEND CHURCH OR RELIGIOUS SERVICES?: 1 TO 4 TIMES PER YEAR
HOW OFTEN DO YOU ATTENT MEETINGS OF THE CLUB OR ORGANIZATION YOU BELONG TO?: NEVER
HOW OFTEN DO YOU GET TOGETHER WITH FRIENDS OR RELATIVES?: MORE THAN THREE TIMES A WEEK
HOW HARD IS IT FOR YOU TO PAY FOR THE VERY BASICS LIKE FOOD, HOUSING, MEDICAL CARE, AND HEATING?: NOT HARD AT ALL
HOW OFTEN DO YOU GET TOGETHER WITH FRIENDS OR RELATIVES?: MORE THAN THREE TIMES A WEEK
DO YOU BELONG TO ANY CLUBS OR ORGANIZATIONS SUCH AS CHURCH GROUPS UNIONS, FRATERNAL OR ATHLETIC GROUPS, OR SCHOOL GROUPS?: NO
HOW OFTEN DO YOU HAVE SIX OR MORE DRINKS ON ONE OCCASION: NEVER
WITHIN THE PAST 12 MONTHS, YOU WORRIED THAT YOUR FOOD WOULD RUN OUT BEFORE YOU GOT THE MONEY TO BUY MORE: NEVER TRUE
HOW OFTEN DO YOU HAVE A DRINK CONTAINING ALCOHOL: NEVER
DO YOU BELONG TO ANY CLUBS OR ORGANIZATIONS SUCH AS CHURCH GROUPS UNIONS, FRATERNAL OR ATHLETIC GROUPS, OR SCHOOL GROUPS?: NO
IN A TYPICAL WEEK, HOW MANY TIMES DO YOU TALK ON THE PHONE WITH FAMILY, FRIENDS, OR NEIGHBORS?: MORE THAN THREE TIMES A WEEK
HOW MANY DRINKS CONTAINING ALCOHOL DO YOU HAVE ON A TYPICAL DAY WHEN YOU ARE DRINKING: 1 OR 2
IN A TYPICAL WEEK, HOW MANY TIMES DO YOU TALK ON THE PHONE WITH FAMILY, FRIENDS, OR NEIGHBORS?: MORE THAN THREE TIMES A WEEK
HOW OFTEN DO YOU ATTEND CHURCH OR RELIGIOUS SERVICES?: 1 TO 4 TIMES PER YEAR
IN THE PAST 12 MONTHS, HAS THE ELECTRIC, GAS, OIL, OR WATER COMPANY THREATENED TO SHUT OFF SERVICE IN YOUR HOME?: NO

## 2024-08-21 ASSESSMENT — ENCOUNTER SYMPTOMS
FEVER: 0
HEARTBURN: 0
CHILLS: 0
SHORTNESS OF BREATH: 0
ABDOMINAL PAIN: 0
PALPITATIONS: 0
HEADACHES: 0
BLOOD IN STOOL: 0
DOUBLE VISION: 0
WEAKNESS: 0
BACK PAIN: 0
BRUISES/BLEEDS EASILY: 0
DIZZINESS: 0
DEPRESSION: 0
BLURRED VISION: 0

## 2024-08-21 ASSESSMENT — PATIENT HEALTH QUESTIONNAIRE - PHQ9: CLINICAL INTERPRETATION OF PHQ2 SCORE: 0

## 2024-08-21 ASSESSMENT — FIBROSIS 4 INDEX: FIB4 SCORE: 1.39

## 2024-08-21 NOTE — PROGRESS NOTES
"Subjective:     CC: Establish care    HPI:   Jamilah presents today to reestablish care.  She was previously seen by Dr. Alfredo Gomez.  She has a past medical history of mixed dyslipidemia, GERD, anxiety depression, and postmenopausal vasomotor symptoms.  Patient had lab work done recently showing normal CBC, and elevated A1c: 5.9%.  She is due for other labs such as metabolic panel and lipid profile.  Patient otherwise states feeling well.  She states that since diagnosis of prediabetes she has been exercising regularly and also following with a dietitian.  She states about 12 pound weight lost over the past 2 months.    Problem   Mixed Dyslipidemia       ROS:  Review of Systems   Constitutional:  Negative for chills, fever and malaise/fatigue.   HENT:  Negative for nosebleeds and tinnitus.    Eyes:  Negative for blurred vision and double vision.   Respiratory:  Negative for shortness of breath.    Cardiovascular:  Negative for chest pain and palpitations.   Gastrointestinal:  Negative for abdominal pain, blood in stool, heartburn and melena.   Genitourinary:  Negative for dysuria and urgency.   Musculoskeletal:  Negative for back pain and joint pain.   Skin:  Negative for rash.   Neurological:  Negative for dizziness, weakness and headaches.   Endo/Heme/Allergies:  Does not bruise/bleed easily.   Psychiatric/Behavioral:  Negative for depression and suicidal ideas.        Objective:     Exam:  /64 (BP Location: Right arm, Patient Position: Sitting, BP Cuff Size: Adult)   Pulse 70   Temp 35.9 °C (96.7 °F) (Temporal)   Ht 1.626 m (5' 4\")   Wt 64.9 kg (143 lb)   LMP 04/25/2014   SpO2 99%   BMI 24.55 kg/m²  Body mass index is 24.55 kg/m².    Physical Exam  Vitals reviewed.   Constitutional:       General: She is not in acute distress.  HENT:      Head: Normocephalic and atraumatic.      Right Ear: Tympanic membrane and ear canal normal.      Left Ear: Tympanic membrane and ear canal normal.      " Mouth/Throat:      Mouth: Mucous membranes are moist.   Eyes:      General: No scleral icterus.     Extraocular Movements: Extraocular movements intact.      Pupils: Pupils are equal, round, and reactive to light.   Cardiovascular:      Rate and Rhythm: Normal rate and regular rhythm.      Pulses: Normal pulses.      Heart sounds: Normal heart sounds. No murmur heard.  Pulmonary:      Effort: No respiratory distress.      Breath sounds: No wheezing.   Abdominal:      General: There is no distension.      Palpations: Abdomen is soft.      Tenderness: There is no abdominal tenderness. There is no guarding or rebound.   Musculoskeletal:      Cervical back: Normal range of motion and neck supple.      Right lower leg: No edema.      Left lower leg: No edema.   Lymphadenopathy:      Cervical: No cervical adenopathy.   Skin:     General: Skin is warm.      Capillary Refill: Capillary refill takes less than 2 seconds.      Coloration: Skin is not jaundiced.   Neurological:      General: No focal deficit present.      Mental Status: She is alert.      Cranial Nerves: No cranial nerve deficit.   Psychiatric:         Mood and Affect: Mood normal.         Behavior: Behavior normal.       Labs: Reviewed.  Ordered new labs.    Assessment & Plan:     54 y.o. female with the following -     1. Mixed dyslipidemia  -Chronic, not treated medically  -Per chart review in July 2023 her total cholesterol was elevated: 248, and LDL was also elevated: 180.  Otherwise triglycerides and HDL were normal.  -Apparently her previous PCP was treating her with diet and exercise, however she might need statin therapy.  -She is states that she has multiple family members with heart disease, however cannot recall if they were diagnosed at young age.  -Will repeat lipid profile calculate ASCVD score    - Comp Metabolic Panel; Future  - Lipid Profile; Future    2. Anxiety  -Chronic, stable  -Currently on Zoloft 50 mg daily  -Medication was also given  for postmenopausal symptoms    - sertraline (ZOLOFT) 50 MG Tab; Take 1 Tablet by mouth every day.  Dispense: 90 Tablet; Refill: 2    3. Postmenopausal  -Chronic, stable  -Currently taking Estrace and Zoloft  -Continue same therapy  -Following with OB/GYN  - estradiol (ESTRACE) 0.1 MG/GM vaginal cream; Apply 0.5g intravaginally 3x weekly  Dispense: 42.5 g; Refill: 3    4. Prediabetes  -Recent diagnosis in June 2024  -A1c was 5.9%, previously 5.6% back in July 2023  -Since diagnosis of prediabetes patient has been exercising and watching her diet, she is also following with a dietitian.  Patient states that she has already lost around 12 pounds.  -Will repeat labs again next year  - Comp Metabolic Panel; Future       Return in about 3 weeks (around 9/11/2024) for Labs.    Please note that this dictation was created using voice recognition software. I have made every reasonable attempt to correct obvious errors, but I expect that there are errors of grammar and possibly content that I did not discover before finalizing the note.

## 2024-10-12 ENCOUNTER — HOSPITAL ENCOUNTER (OUTPATIENT)
Dept: LAB | Facility: MEDICAL CENTER | Age: 54
End: 2024-10-12
Attending: STUDENT IN AN ORGANIZED HEALTH CARE EDUCATION/TRAINING PROGRAM
Payer: COMMERCIAL

## 2024-10-12 DIAGNOSIS — R73.03 PREDIABETES: ICD-10-CM

## 2024-10-12 DIAGNOSIS — E78.2 MIXED DYSLIPIDEMIA: ICD-10-CM

## 2024-10-12 LAB
ALBUMIN SERPL BCP-MCNC: 4.4 G/DL (ref 3.2–4.9)
ALBUMIN/GLOB SERPL: 1.8 G/DL
ALP SERPL-CCNC: 59 U/L (ref 30–99)
ALT SERPL-CCNC: 21 U/L (ref 2–50)
ANION GAP SERPL CALC-SCNC: 9 MMOL/L (ref 7–16)
AST SERPL-CCNC: 18 U/L (ref 12–45)
BILIRUB SERPL-MCNC: 0.4 MG/DL (ref 0.1–1.5)
BUN SERPL-MCNC: 14 MG/DL (ref 8–22)
CALCIUM ALBUM COR SERPL-MCNC: 9.5 MG/DL (ref 8.5–10.5)
CALCIUM SERPL-MCNC: 9.8 MG/DL (ref 8.5–10.5)
CHLORIDE SERPL-SCNC: 102 MMOL/L (ref 96–112)
CHOLEST SERPL-MCNC: 207 MG/DL (ref 100–199)
CO2 SERPL-SCNC: 27 MMOL/L (ref 20–33)
CREAT SERPL-MCNC: 0.74 MG/DL (ref 0.5–1.4)
GFR SERPLBLD CREATININE-BSD FMLA CKD-EPI: 95 ML/MIN/1.73 M 2
GLOBULIN SER CALC-MCNC: 2.4 G/DL (ref 1.9–3.5)
GLUCOSE SERPL-MCNC: 86 MG/DL (ref 65–99)
HDLC SERPL-MCNC: 68 MG/DL
LDLC SERPL CALC-MCNC: 130 MG/DL
POTASSIUM SERPL-SCNC: 4.4 MMOL/L (ref 3.6–5.5)
PROT SERPL-MCNC: 6.8 G/DL (ref 6–8.2)
SODIUM SERPL-SCNC: 138 MMOL/L (ref 135–145)
TRIGL SERPL-MCNC: 46 MG/DL (ref 0–149)

## 2024-10-12 PROCEDURE — 80053 COMPREHEN METABOLIC PANEL: CPT

## 2024-10-12 PROCEDURE — 36415 COLL VENOUS BLD VENIPUNCTURE: CPT

## 2024-10-12 PROCEDURE — 80061 LIPID PANEL: CPT

## 2024-10-14 ENCOUNTER — OFFICE VISIT (OUTPATIENT)
Dept: MEDICAL GROUP | Age: 54
End: 2024-10-14
Payer: COMMERCIAL

## 2024-10-14 VITALS
DIASTOLIC BLOOD PRESSURE: 60 MMHG | TEMPERATURE: 97.3 F | OXYGEN SATURATION: 96 % | HEART RATE: 65 BPM | BODY MASS INDEX: 24.24 KG/M2 | WEIGHT: 142 LBS | SYSTOLIC BLOOD PRESSURE: 110 MMHG | HEIGHT: 64 IN

## 2024-10-14 DIAGNOSIS — R73.03 PREDIABETES: ICD-10-CM

## 2024-10-14 DIAGNOSIS — E78.2 MIXED DYSLIPIDEMIA: ICD-10-CM

## 2024-10-14 DIAGNOSIS — Z78.0 POSTMENOPAUSAL: ICD-10-CM

## 2024-10-14 PROCEDURE — 99214 OFFICE O/P EST MOD 30 MIN: CPT | Performed by: STUDENT IN AN ORGANIZED HEALTH CARE EDUCATION/TRAINING PROGRAM

## 2024-10-14 PROCEDURE — 3074F SYST BP LT 130 MM HG: CPT | Performed by: STUDENT IN AN ORGANIZED HEALTH CARE EDUCATION/TRAINING PROGRAM

## 2024-10-14 PROCEDURE — 3078F DIAST BP <80 MM HG: CPT | Performed by: STUDENT IN AN ORGANIZED HEALTH CARE EDUCATION/TRAINING PROGRAM

## 2024-10-14 ASSESSMENT — ENCOUNTER SYMPTOMS
SHORTNESS OF BREATH: 0
BACK PAIN: 0
FEVER: 0
BLOOD IN STOOL: 0
BLURRED VISION: 0
DOUBLE VISION: 0
PALPITATIONS: 0
DEPRESSION: 0
BRUISES/BLEEDS EASILY: 0
CHILLS: 0
WEAKNESS: 0
DIZZINESS: 0
HEADACHES: 0

## 2024-10-14 ASSESSMENT — FIBROSIS 4 INDEX: FIB4 SCORE: 1.09

## 2025-04-05 ENCOUNTER — HOSPITAL ENCOUNTER (OUTPATIENT)
Dept: LAB | Facility: MEDICAL CENTER | Age: 55
End: 2025-04-05
Attending: STUDENT IN AN ORGANIZED HEALTH CARE EDUCATION/TRAINING PROGRAM
Payer: COMMERCIAL

## 2025-04-05 DIAGNOSIS — R73.03 PREDIABETES: ICD-10-CM

## 2025-04-05 DIAGNOSIS — E78.2 MIXED DYSLIPIDEMIA: ICD-10-CM

## 2025-04-05 LAB
CHOLEST SERPL-MCNC: 210 MG/DL (ref 100–199)
EST. AVERAGE GLUCOSE BLD GHB EST-MCNC: 111 MG/DL
HBA1C MFR BLD: 5.5 % (ref 4–5.6)
HDLC SERPL-MCNC: 73 MG/DL
LDLC SERPL CALC-MCNC: 130 MG/DL
TRIGL SERPL-MCNC: 33 MG/DL (ref 0–149)

## 2025-04-05 PROCEDURE — 83036 HEMOGLOBIN GLYCOSYLATED A1C: CPT

## 2025-04-05 PROCEDURE — 36415 COLL VENOUS BLD VENIPUNCTURE: CPT

## 2025-04-05 PROCEDURE — 80061 LIPID PANEL: CPT

## 2025-04-14 ENCOUNTER — APPOINTMENT (OUTPATIENT)
Dept: MEDICAL GROUP | Age: 55
End: 2025-04-14

## 2025-04-14 VITALS
WEIGHT: 141 LBS | HEART RATE: 60 BPM | HEIGHT: 64 IN | DIASTOLIC BLOOD PRESSURE: 62 MMHG | BODY MASS INDEX: 24.07 KG/M2 | SYSTOLIC BLOOD PRESSURE: 108 MMHG | TEMPERATURE: 97.4 F | OXYGEN SATURATION: 97 %

## 2025-04-14 DIAGNOSIS — Z78.0 POSTMENOPAUSAL: ICD-10-CM

## 2025-04-14 DIAGNOSIS — I95.1 ORTHOSTATIC HYPOTENSION: ICD-10-CM

## 2025-04-14 DIAGNOSIS — F41.9 ANXIETY: ICD-10-CM

## 2025-04-14 DIAGNOSIS — R73.03 PREDIABETES: ICD-10-CM

## 2025-04-14 DIAGNOSIS — R05.2 SUBACUTE COUGH: ICD-10-CM

## 2025-04-14 PROCEDURE — 3078F DIAST BP <80 MM HG: CPT | Performed by: STUDENT IN AN ORGANIZED HEALTH CARE EDUCATION/TRAINING PROGRAM

## 2025-04-14 PROCEDURE — 99214 OFFICE O/P EST MOD 30 MIN: CPT | Performed by: STUDENT IN AN ORGANIZED HEALTH CARE EDUCATION/TRAINING PROGRAM

## 2025-04-14 PROCEDURE — 3074F SYST BP LT 130 MM HG: CPT | Performed by: STUDENT IN AN ORGANIZED HEALTH CARE EDUCATION/TRAINING PROGRAM

## 2025-04-14 RX ORDER — ESTRADIOL 0.1 MG/G
CREAM VAGINAL
Qty: 42.5 G | Refills: 3 | Status: SHIPPED | OUTPATIENT
Start: 2025-04-14

## 2025-04-14 ASSESSMENT — ENCOUNTER SYMPTOMS
BLOOD IN STOOL: 0
BLURRED VISION: 0
FEVER: 0
DEPRESSION: 0
SHORTNESS OF BREATH: 0
DOUBLE VISION: 0
BACK PAIN: 0
ORTHOPNEA: 0
HEADACHES: 0
WHEEZING: 0
PALPITATIONS: 0
BRUISES/BLEEDS EASILY: 0
ABDOMINAL PAIN: 0
WEAKNESS: 0
COUGH: 0
DIZZINESS: 0
CHILLS: 0

## 2025-04-14 ASSESSMENT — PATIENT HEALTH QUESTIONNAIRE - PHQ9: CLINICAL INTERPRETATION OF PHQ2 SCORE: 0

## 2025-04-14 ASSESSMENT — FIBROSIS 4 INDEX: FIB4 SCORE: 1.09

## 2025-04-14 ASSESSMENT — LIFESTYLE VARIABLES: SUBSTANCE_ABUSE: 0

## 2025-04-14 NOTE — PATIENT INSTRUCTIONS
-Continue home meds  -Monitor BP twice daily  -Avoid abrupt changes in position from supine to standing   -Complete blood work  -Schedule Imaging and PFT's  -Follow up within 3-4 weeks

## 2025-04-14 NOTE — PROGRESS NOTES
Subjective:     CC: Subacute cough and  low blood pressure readings.    HPI:   History of Present Illness  The patient is a 54-year-old female presenting for subacute cough and hypotension.    She reports experiencing a persistent cough, which she describes as painful and occurring daily, predominantly in the afternoons. She also notes the presence of mucus and severe pain. Despite taking Claritin, her symptoms have not improved. The cough occasionally manifests at night or during conversations, particularly when raising her voice. She has no history of asthma but was previously diagnosed with rhinitis in [place]. She has not been on any medication for this condition recently. She has a past medical history of whooping cough and frequently experiences hoarseness.    Approximately 15 days ago, she experienced an episode of hypotension, with a recorded blood pressure of 100/55, accompanied by dizziness that rendered her unable to stand. This episode lasted the entire day. She also reported a bradycardic pulse rate in the 50s. She recalls a similar episode where she felt dizzy and overheated after bending down to put on her shoes, which she attributed to rapid movement. She has a history of low blood pressure, with a previous reading of 90/50, which persisted for two days, causing her to remain bedridden. She was administered intravenous fluids during this period.    She requests refills for her sertraline and cream prescriptions.    MEDICATIONS  Current: Sertraline, Claritin       ROS:  Review of Systems   Constitutional:  Negative for chills, fever and malaise/fatigue.   HENT:  Negative for nosebleeds and tinnitus.    Eyes:  Negative for blurred vision and double vision.   Respiratory:  Negative for cough, shortness of breath and wheezing.    Cardiovascular:  Negative for chest pain, palpitations, orthopnea and leg swelling.   Gastrointestinal:  Negative for abdominal pain, blood in stool and melena.   Genitourinary:   "Negative for dysuria, frequency and urgency.   Musculoskeletal:  Negative for back pain and joint pain.   Skin:  Negative for rash.   Neurological:  Negative for dizziness, weakness and headaches.   Endo/Heme/Allergies:  Does not bruise/bleed easily.   Psychiatric/Behavioral:  Negative for depression, substance abuse and suicidal ideas.        Objective:     Exam:  /62 (BP Location: Right arm, Patient Position: Sitting, BP Cuff Size: Adult)   Pulse 60   Temp 36.3 °C (97.4 °F) (Temporal)   Ht 1.626 m (5' 4\")   Wt 64 kg (141 lb)   LMP 04/25/2014   SpO2 97%   BMI 24.20 kg/m²  Body mass index is 24.2 kg/m².    Physical Exam  Vitals reviewed.   Constitutional:       General: She is not in acute distress.  HENT:      Head: Normocephalic and atraumatic.      Mouth/Throat:      Mouth: Mucous membranes are moist.   Eyes:      General: No scleral icterus.     Extraocular Movements: Extraocular movements intact.      Pupils: Pupils are equal, round, and reactive to light.   Cardiovascular:      Rate and Rhythm: Normal rate and regular rhythm.      Pulses: Normal pulses.      Heart sounds: Normal heart sounds. No murmur heard.  Pulmonary:      Effort: Pulmonary effort is normal. No respiratory distress.      Breath sounds: Normal breath sounds. No wheezing.   Abdominal:      General: There is no distension.      Palpations: Abdomen is soft.      Tenderness: There is no abdominal tenderness. There is no guarding or rebound.   Musculoskeletal:      Cervical back: Normal range of motion and neck supple.      Right lower leg: No edema.      Left lower leg: No edema.   Skin:     General: Skin is warm.      Capillary Refill: Capillary refill takes less than 2 seconds.      Coloration: Skin is not jaundiced.      Findings: No bruising.   Neurological:      General: No focal deficit present.      Mental Status: She is alert.   Psychiatric:         Mood and Affect: Mood normal.         Behavior: Behavior normal.       Labs: " Reviewed    Assessment & Plan:       1. Orthostatic hypotension  She experienced a significant drop in blood pressure to 100/55 mmHg, accompanied by dizziness and difficulty standing. This episode lasted for a whole day.   Her blood pressure today was within normal limits 108/62.  She is advised to monitor her blood pressure regularly, especially when feeling dizzy, and to record the readings. She should avoid sudden movements such as standing up quickly from a lying position. Laboratory tests will be conducted to check for thyroid and cortisol abnormalities, which can affect blood pressure.  - CORTISOL - AM  - FREE THYROXINE; Future  - TSH; Future    2. Subacute cough  She reports a persistent cough that occurs almost daily, sometimes in the afternoon and sometimes at night, accompanied by mucus production and chest pain. She has been taking Claritin without relief. An asthma evaluation and radiographic imaging will be conducted to rule out asthma or other underlying conditions. Laboratory tests, including thyroid function and cortisol levels, will be performed to check for any abnormalities that might be contributing to her symptoms.  - DX-CHEST-2 VIEWS; Future  - PULMONARY FUNCTION TESTS -Test requested: Spirometry with-out & with Bronchodilator; Future    3. Anxiety  -Medication refills  - sertraline (ZOLOFT) 50 MG Tab; Take 1 Tablet by mouth every day.  Dispense: 90 Tablet; Refill: 2    4. Postmenopausal  -Medication refills  - estradiol (ESTRACE) 0.1 MG/GM vaginal cream; Apply 0.5g intravaginally 3x weekly  Dispense: 42.5 g; Refill: 3     5. Prediabetes  Patient A1c improved from 5.9 to 5.5%.  States significant weight loss over the past few months and also has been adhering to regular exercise and healthier diet.      Return in about 4 weeks (around 5/12/2025) for Labs, Imaging .    Please note that this dictation was created using voice recognition software. I have made every reasonable attempt to correct  obvious errors, but I expect that there are errors of grammar and possibly content that I did not discover before finalizing the note.

## 2025-04-15 NOTE — Clinical Note
REFERRAL APPROVAL NOTICE         Sent on April 15, 2025                   Jamilah Martinez  [address]  [place] [address]                   Dear Ms. Netta Martinez,    After a careful review of the medical information and benefit coverage, [facility] has processed your referral. See below for additional details.    If applicable, you must be actively enrolled with your insurance for coverage of the authorized service. If you have any questions regarding your coverage, please contact your insurance directly.    REFERRAL INFORMATION   Referral #:  [id number]  Referred-To Department    Referred-By Provider:  Pulmonary and Sleep Medicine    Nomi Newton M.D.   Pulmonary Rehab [facility]      [address] [place] [address]  [phone] [id number]  [place] [address]  [phone]    Referral Start Date:  04/14/2025  Referral End Date:   04/14/2026             SCHEDULING  If you do not already have an appointment, please call [phone] to make an appointment.     MORE INFORMATION  If you do not already have a [employer] account, sign up at: [email or web address]  You can access your medical information, make appointments, see lab results, billing information, and more.  If you have questions regarding this referral, please contact  the [facility] Referrals department at:             [phone]. Monday - Friday 8:00AM - 5:00PM.     Sincerely,    [facility]

## 2025-05-04 DIAGNOSIS — J45.909 ASTHMA DUE TO ENVIRONMENTAL ALLERGIES: ICD-10-CM

## 2025-05-05 ENCOUNTER — APPOINTMENT (OUTPATIENT)
Dept: RADIOLOGY | Facility: MEDICAL CENTER | Age: 55
End: 2025-05-05
Attending: STUDENT IN AN ORGANIZED HEALTH CARE EDUCATION/TRAINING PROGRAM
Payer: COMMERCIAL

## 2025-05-05 ENCOUNTER — HOSPITAL ENCOUNTER (OUTPATIENT)
Dept: PULMONOLOGY | Facility: MEDICAL CENTER | Age: 55
End: 2025-05-05
Attending: STUDENT IN AN ORGANIZED HEALTH CARE EDUCATION/TRAINING PROGRAM
Payer: COMMERCIAL

## 2025-05-05 VITALS — BODY MASS INDEX: 24.2 KG/M2 | HEIGHT: 64 IN

## 2025-05-05 DIAGNOSIS — R05.2 SUBACUTE COUGH: ICD-10-CM

## 2025-05-05 PROCEDURE — 71046 X-RAY EXAM CHEST 2 VIEWS: CPT

## 2025-05-05 PROCEDURE — 94070 EVALUATION OF WHEEZING: CPT

## 2025-05-05 RX ORDER — ALBUTEROL SULFATE 5 MG/ML
2.5 SOLUTION RESPIRATORY (INHALATION)
Status: DISCONTINUED | OUTPATIENT
Start: 2025-05-05 | End: 2025-05-06 | Stop reason: HOSPADM

## 2025-05-05 RX ADMIN — ALBUTEROL SULFATE 2.5 MG: 5 SOLUTION RESPIRATORY (INHALATION) at 14:30

## 2025-05-05 NOTE — Clinical Note
REFERRAL APPROVAL NOTICE         Sent on May 5, 2025                   Jamilah Martinez  [address]  [place] [address]                   Dear Ms. Netta Martinez,    After a careful review of the medical information and benefit coverage, [facility] has processed your referral. See below for additional details.    If applicable, you must be actively enrolled with your insurance for coverage of the authorized service. If you have any questions regarding your coverage, please contact your insurance directly.    REFERRAL INFORMATION   Referral #:  [id number]  Referred-To Department    Referred-By Provider:  Pulmonary and Sleep Medicine    Nomi Newton M.D.   Pulmonary Rehab [facility]      [address] [place] [address]  [phone] [id number]  [place] [address]  [phone]    Referral Start Date:  05/04/2025  Referral End Date:   05/04/2026             SCHEDULING  If you do not already have an appointment, please call [phone] to make an appointment.     MORE INFORMATION  If you do not already have a [employer] account, sign up at: [email or web address]  You can access your medical information, make appointments, see lab results, billing information, and more.  If you have questions regarding this referral, please contact  the [facility] Referrals department at:             [phone]. Monday - Friday 8:00AM - 5:00PM.     Sincerely,    [facility]

## 2025-05-09 ENCOUNTER — HOSPITAL ENCOUNTER (OUTPATIENT)
Dept: LAB | Facility: MEDICAL CENTER | Age: 55
End: 2025-05-09
Attending: STUDENT IN AN ORGANIZED HEALTH CARE EDUCATION/TRAINING PROGRAM
Payer: COMMERCIAL

## 2025-05-09 DIAGNOSIS — I95.1 ORTHOSTATIC HYPOTENSION: ICD-10-CM

## 2025-05-09 LAB
CORTIS SERPL-MCNC: 13.8 UG/DL (ref 0–23)
T4 FREE SERPL-MCNC: 1.08 NG/DL (ref 0.93–1.7)
TSH SERPL-ACNC: 2.86 UIU/ML (ref 0.38–5.33)

## 2025-05-09 PROCEDURE — 36415 COLL VENOUS BLD VENIPUNCTURE: CPT

## 2025-05-09 PROCEDURE — 84439 ASSAY OF FREE THYROXINE: CPT

## 2025-05-09 PROCEDURE — 84443 ASSAY THYROID STIM HORMONE: CPT

## 2025-05-09 PROCEDURE — 82533 TOTAL CORTISOL: CPT

## 2025-05-09 NOTE — PROCEDURES
DATE OF SERVICE:  05/05/2025     METHACHOLINE CHALLENGE TESTING     INTERPRETATION:  1.  Baseline FEV1 - 2.69 L (104%), FVC 3.20 L (98% ), FEV1/FVC ratio 84%.  2.  Flow volume Loop appears normal .  3.  There is no significant positive post bronchodilator response.     IMPRESSION:  There is no significant decrease in FEV1 with increased dose of methacholine challenge test. Therefore, the test is considered negative as it does not meet 20 % criteria post challenge.   However there is up to 19% decrease in FEV1 with methacholine challenge, which may be concerning for increased airway reactivity.      ______________________________  MD CHACE Atkinson/NATTY    DD:  05/09/2025 07:23  DT:  05/09/2025 07:36    Job#:  [id number]

## 2025-05-14 ENCOUNTER — OFFICE VISIT (OUTPATIENT)
Dept: MEDICAL GROUP | Age: 55
End: 2025-05-14
Payer: COMMERCIAL

## 2025-05-14 VITALS
WEIGHT: 147 LBS | OXYGEN SATURATION: 99 % | SYSTOLIC BLOOD PRESSURE: 100 MMHG | BODY MASS INDEX: 25.1 KG/M2 | HEIGHT: 64 IN | TEMPERATURE: 98.2 F | HEART RATE: 74 BPM | DIASTOLIC BLOOD PRESSURE: 62 MMHG

## 2025-05-14 DIAGNOSIS — R05.3 CHRONIC COUGH: Primary | ICD-10-CM

## 2025-05-14 DIAGNOSIS — I95.1 ORTHOSTATIC HYPOTENSION: ICD-10-CM

## 2025-05-14 DIAGNOSIS — E78.2 MIXED DYSLIPIDEMIA: ICD-10-CM

## 2025-05-14 PROCEDURE — 3078F DIAST BP <80 MM HG: CPT | Performed by: STUDENT IN AN ORGANIZED HEALTH CARE EDUCATION/TRAINING PROGRAM

## 2025-05-14 PROCEDURE — 99214 OFFICE O/P EST MOD 30 MIN: CPT | Performed by: STUDENT IN AN ORGANIZED HEALTH CARE EDUCATION/TRAINING PROGRAM

## 2025-05-14 PROCEDURE — 3074F SYST BP LT 130 MM HG: CPT | Performed by: STUDENT IN AN ORGANIZED HEALTH CARE EDUCATION/TRAINING PROGRAM

## 2025-05-14 ASSESSMENT — ENCOUNTER SYMPTOMS
SHORTNESS OF BREATH: 0
PALPITATIONS: 0
FEVER: 0
BACK PAIN: 0
CHILLS: 0
HEMOPTYSIS: 0
BLOOD IN STOOL: 0
ORTHOPNEA: 0
DOUBLE VISION: 0
COUGH: 1
DEPRESSION: 0
BRUISES/BLEEDS EASILY: 0
BLURRED VISION: 0
HEADACHES: 0
SPUTUM PRODUCTION: 0
WHEEZING: 0
ABDOMINAL PAIN: 0
DIZZINESS: 0
WEAKNESS: 0

## 2025-05-14 ASSESSMENT — FIBROSIS 4 INDEX: FIB4 SCORE: 1.11

## 2025-05-14 ASSESSMENT — LIFESTYLE VARIABLES: SUBSTANCE_ABUSE: 0

## 2025-05-14 NOTE — PATIENT INSTRUCTIONS
-May take zyrtec and use flonase daily  -If symptoms persist or worsen ENT referral might be needed  -Otherwise follow up next year

## 2025-05-14 NOTE — PROGRESS NOTES
"Subjective:     CC: Blood work, Imaging follow up    HPI:   History of Present Illness  The patient is a 55-year-old female presenting for follow-up.    She reports no improvement in her condition, with persistent symptoms including a cough that was particularly pronounced yesterday afternoon. She speculates that this may be due to exposure to rain.    She has been monitoring her blood pressure intermittently, noting a significant drop in blood pressure following the use of an ashwagandha supplement, which she had taken for sleep disturbances. She discontinued the supplement and has not observed any further significant drops in blood pressure.    She also reports daily postnasal drip, a symptom she has been experiencing for some time. A previous head x-ray conducted in [place] revealed acute rhinitis. Despite taking Zyrtec, she continues to experience these symptoms.    MEDICATIONS  Current: Zyrtec       ROS:  Review of Systems   Constitutional:  Negative for chills, fever and malaise/fatigue.   HENT:  Negative for nosebleeds and tinnitus.    Eyes:  Negative for blurred vision and double vision.   Respiratory:  Positive for cough. Negative for hemoptysis, sputum production, shortness of breath and wheezing.    Cardiovascular:  Negative for chest pain, palpitations, orthopnea and leg swelling.   Gastrointestinal:  Negative for abdominal pain, blood in stool and melena.   Genitourinary:  Negative for dysuria, frequency and urgency.   Musculoskeletal:  Negative for back pain and joint pain.   Skin:  Negative for rash.   Neurological:  Negative for dizziness, weakness and headaches.   Endo/Heme/Allergies:  Does not bruise/bleed easily.   Psychiatric/Behavioral:  Negative for depression, substance abuse and suicidal ideas.        Objective:     Exam:  /62 (BP Location: Right arm, Patient Position: Sitting, BP Cuff Size: Adult)   Pulse 74   Temp 36.8 °C (98.2 °F) (Temporal)   Ht 1.626 m (5' 4\")   Wt 66.7 kg (147 " lb)   LMP 04/25/2014   SpO2 99%   BMI 25.23 kg/m²  Body mass index is 25.23 kg/m².    Physical Exam  Vitals reviewed.   Constitutional:       General: She is not in acute distress.  HENT:      Head: Normocephalic and atraumatic.      Mouth/Throat:      Mouth: Mucous membranes are moist.   Eyes:      General: No scleral icterus.     Extraocular Movements: Extraocular movements intact.      Pupils: Pupils are equal, round, and reactive to light.   Cardiovascular:      Rate and Rhythm: Normal rate and regular rhythm.      Pulses: Normal pulses.      Heart sounds: Normal heart sounds. No murmur heard.  Pulmonary:      Effort: Pulmonary effort is normal. No respiratory distress.      Breath sounds: Normal breath sounds. No wheezing.   Abdominal:      General: There is no distension.      Palpations: Abdomen is soft.      Tenderness: There is no abdominal tenderness. There is no guarding or rebound.   Musculoskeletal:      Cervical back: Normal range of motion and neck supple.      Right lower leg: No edema.      Left lower leg: No edema.   Lymphadenopathy:      Cervical: No cervical adenopathy.   Skin:     General: Skin is warm.      Capillary Refill: Capillary refill takes less than 2 seconds.      Coloration: Skin is not jaundiced.      Findings: No bruising.   Neurological:      General: No focal deficit present.      Mental Status: She is alert.      Cranial Nerves: No cranial nerve deficit.      Sensory: No sensory deficit.   Psychiatric:         Mood and Affect: Mood normal.         Behavior: Behavior normal.       Labs: Reviewed    Assessment & Plan:       1. Chronic cough (Primary)  Chest x-rays and PFTs were normal.  She has been experiencing daily postnasal drip, which may be contributing to her symptoms. She has been advised to use Flonase nasal spray and Zyrtec for allergy management. If these measures prove ineffective, a referral to an ENT specialist will be considered.    2. Mixed dyslipidemia  Her  cholesterol levels are elevated but not critically high.   She will have her cholesterol levels checked  next year.  Encouraged regular exercise, DASH diet or plant-based diet.    3. Orthostatic hypotension  Her blood pressure readings have consistently fallen within the lower end of the normal range, but not to a degree that would necessitate pharmacological intervention.   Cortisol levels  were within normal range.  Pressure at home over the past several weeks has been in the low 100s over 60s.  Blood pressure today at our office was 100/62.  Patient is asymptomatic.  She has been advised to incorporate a slightly higher intake of sodium into her diet and to continue monitoring her blood pressure at home.       Return in about 1 year (around 5/14/2026) for Annual, Labs.    Please note that this dictation was created using voice recognition software. I have made every reasonable attempt to correct obvious errors, but I expect that there are errors of grammar and possibly content that I did not discover before finalizing the note.

## 2025-05-15 DIAGNOSIS — F41.9 ANXIETY: ICD-10-CM

## 2025-05-15 NOTE — TELEPHONE ENCOUNTER
Received request via: Pharmacy    Was the patient seen in the last year in this department? Yes    Does the patient have an active prescription (recently filled or refills available) for medication(s) requested? No    Pharmacy Name: [facility] [id number] - [place] - [address]     Does the patient have half-way Plus and need 100-day supply? (This applies to ALL medications) Patient does not have SCP

## 2025-08-26 ENCOUNTER — OFFICE VISIT (OUTPATIENT)
Dept: URGENT CARE | Facility: PHYSICIAN GROUP | Age: 55
End: 2025-08-26
Payer: COMMERCIAL

## 2025-08-26 VITALS
SYSTOLIC BLOOD PRESSURE: 102 MMHG | HEART RATE: 66 BPM | BODY MASS INDEX: 24.12 KG/M2 | OXYGEN SATURATION: 95 % | DIASTOLIC BLOOD PRESSURE: 60 MMHG | HEIGHT: 64 IN | RESPIRATION RATE: 18 BRPM | WEIGHT: 141.3 LBS | TEMPERATURE: 97.3 F

## 2025-08-26 DIAGNOSIS — H00.024 HORDEOLUM INTERNUM LEFT UPPER EYELID: Primary | ICD-10-CM

## 2025-08-26 PROCEDURE — 99213 OFFICE O/P EST LOW 20 MIN: CPT | Performed by: PHYSICIAN ASSISTANT

## 2025-08-26 PROCEDURE — 3078F DIAST BP <80 MM HG: CPT | Performed by: PHYSICIAN ASSISTANT

## 2025-08-26 PROCEDURE — 3074F SYST BP LT 130 MM HG: CPT | Performed by: PHYSICIAN ASSISTANT

## 2025-08-26 RX ORDER — POLYMYXIN B SULFATE AND TRIMETHOPRIM 1; 10000 MG/ML; [USP'U]/ML
1 SOLUTION OPHTHALMIC EVERY 4 HOURS
Qty: 4 ML | Refills: 0 | Status: SHIPPED | OUTPATIENT
Start: 2025-08-26 | End: 2025-09-02

## 2025-08-26 RX ORDER — IBUPROFEN 800 MG/1
TABLET, FILM COATED ORAL
COMMUNITY
Start: 2025-06-30

## 2025-08-26 ASSESSMENT — ENCOUNTER SYMPTOMS
PHOTOPHOBIA: 0
EYE DISCHARGE: 0
EYE REDNESS: 0
EYE PAIN: 0
BLURRED VISION: 0

## 2025-08-26 ASSESSMENT — FIBROSIS 4 INDEX: FIB4 SCORE: 1.11

## 2025-10-22 ENCOUNTER — APPOINTMENT (OUTPATIENT)
Dept: RADIOLOGY | Facility: MEDICAL CENTER | Age: 55
End: 2025-10-22
Attending: STUDENT IN AN ORGANIZED HEALTH CARE EDUCATION/TRAINING PROGRAM
Payer: COMMERCIAL

## 2025-10-22 DIAGNOSIS — Z12.31 VISIT FOR SCREENING MAMMOGRAM: ICD-10-CM

## (undated) DEVICE — TRAY FOLEY CATHETER STATLOCK 16FR SURESTEP  (10EA/CA)

## (undated) DEVICE — GLOVE BIOGEL SZ 6.5 SURGICAL PF LTX (50PR/BX 4BX/CA)

## (undated) DEVICE — TUBE CONNECTING SUCTION - CLEAR PLASTIC STERILE 72 IN (50EA/CA)

## (undated) DEVICE — SUTURE 2-0 VICRYL PLUS CT-2 - 27 INCH (36/BX)

## (undated) DEVICE — GOWN WARMING STANDARD FLEX - (30/CA)

## (undated) DEVICE — KIT SURGIFLO W/OUT THROMBIN - (6EA/CA)

## (undated) DEVICE — SLEEVE VASO CALF MED - (10PR/CA)

## (undated) DEVICE — PACKING VAG 2 X-RAY (24EA/CS)

## (undated) DEVICE — SET IRRIGATION CYSTOSCOPY TUBE L80 IN (20EA/CA)

## (undated) DEVICE — SET LEADWIRE 5 LEAD BEDSIDE DISPOSABLE ECG (1SET OF 5/EA)

## (undated) DEVICE — BLADE SURGICAL #11 - (50/BX)

## (undated) DEVICE — BOVIE NEEDLE TIP INSULATD NON-SAFETY 2CM (50/PK)

## (undated) DEVICE — MASK ANESTHESIA ADULT  - (100/CA)

## (undated) DEVICE — SUTURE GENERAL

## (undated) DEVICE — CANISTER SUCTION RIGID RED 1500CC (40EA/CA)

## (undated) DEVICE — CLOSURE SKIN STRIP 1/2 X 4 IN - (STERI STRIP) (50/BX 4BX/CA)

## (undated) DEVICE — WATER IRRIGATION STERILE 1000ML (12EA/CA)

## (undated) DEVICE — SUTURE 2-0 ETHIBOND GREEN CT-2 TAPER (36PK/BX)

## (undated) DEVICE — PENCIL ELECTSURG 10FT HLSTR - WITH BLADE (50EA/CA)

## (undated) DEVICE — CATHETER IV 20 GA X 1-1/4 ---SURG.& SDS ONLY--- (50EA/BX)

## (undated) DEVICE — ELECTRODE 850 FOAM ADHESIVE - HYDROGEL RADIOTRNSPRNT (50/PK)

## (undated) DEVICE — LACTATED RINGERS INJ 1000 ML - (14EA/CA 60CA/PF)

## (undated) DEVICE — TOWEL STOP TIMEOUT SAFETY FLAG (40EA/CA)

## (undated) DEVICE — HEAD HOLDER JUNIOR/ADULT

## (undated) DEVICE — PAD SANITARY 11IN MAXI IND WRAPPED  (12EA/PK 24PK/CA)

## (undated) DEVICE — SENSOR SPO2 NEO LNCS ADHESIVE (20/BX) SEE USER NOTES

## (undated) DEVICE — NEEDLE WHITACRE SPINAL NON-SAFETY 22GA X 3 1/2 (25EA/BX)

## (undated) DEVICE — DRAPE VAGINAL BIB W/ POUCH (10EA/CA)

## (undated) DEVICE — GLOVE BIOGEL SZ 7 SURGICAL PF LTX - (50PR/BX 4BX/CA)

## (undated) DEVICE — PROTECTOR ULNA NERVE - (36PR/CA)

## (undated) DEVICE — Device

## (undated) DEVICE — KIT ANESTHESIA W/CIRCUIT & 3/LT BAG W/FILTER (20EA/CA)

## (undated) DEVICE — SUCTION INSTRUMENT YANKAUER BULBOUS TIP W/O VENT (50EA/CA)

## (undated) DEVICE — NEPTUNE 4 PORT MANIFOLD - (20/PK)

## (undated) DEVICE — SODIUM CHL IRRIGATION 0.9% 1000ML (12EA/CA)

## (undated) DEVICE — ELECTRODE DUAL RETURN W/ CORD - (50/PK)

## (undated) DEVICE — KIT  I.V. START (100EA/CA)

## (undated) DEVICE — TUBING CLEARLINK DUO-VENT - C-FLO (48EA/CA)

## (undated) DEVICE — TRAY SRGPRP PVP IOD WT PRP - (20/CA)

## (undated) DEVICE — CANISTER SUCTION 3000ML MECHANICAL FILTER AUTO SHUTOFF MEDI-VAC NONSTERILE LF DISP  (40EA/CA)